# Patient Record
(demographics unavailable — no encounter records)

---

## 2025-04-07 NOTE — DATA REVIEWED
[FreeTextEntry1] : B/L Screening Mammo - 01/16/2025: Breast density: The breast(s) is/are heterogeneously dense, which may obscure small masses.   There is a focal asymmetry in the lower central right breast posterior depth.   There is an asymmetry in the lateral right breast mid depth.   There is an asymmetry in the slightly lateral left breast posterior depth.   No other significant mammographic findings are seen. IMPRESSION:      1. Focal asymmetry in the lower central right breast posterior depth for which additional mammographic imaging and possible ultrasound is recommended.   2. Asymmetry in the lateral right breast for which additional mammographic imaging and possible ultrasound is recommended.   2. Asymmetry in the lateral left breast for which additional mammographic imaging and possible ultrasound is recommended.   An additional imaging exam of both breast(s) is recommended.   The patient will be sent a letter to return for additional imaging.   BI-RADS 0: INCOMPLETE - NEED ADDITIONAL IMAGING EVALUATION   B/L Dx Mammo & Sono - 02/13/2025: MAMMOGRAM:    Tomosynthesis 3D and 2D imaging of the breast(s) were performed.  Current study was also evaluated with a computer aided detection (CAD) system.   Breast Density: There are scattered areas of fibroglandular density.   In the right breast at 6:00 location there is an irregular mass which persists and additional imaging. The previously questioned asymmetry in the outer left breast partially persists on additional imaging   US BREAST COMPLETE BILATERAL   Ultrasound evaluation was performed including examination of all four quadrants of the breast(s) and the retroareolar regions.   Color flow, Gray scale and real-time ultrasound of both breasts was performed.    In the right breast at 6:00 location 8 cm from the nipple there is a hypoechoic irregular mass measuring 0.4 x 0.5 x 0.6 cm. Ultrasound core biopsy is recommended for further evaluation. No additional suspicious abnormalities were seen in the right breast. In the left breast at 3:00 location 9 cm from the nipple corresponding to the mammographic abnormality there is a benign cyst measuring 0.6 cm. No suspicious lymph nodes were seen in either axilla. OVERALL IMPRESSION:    Indeterminant mass in the right breast at 6:00 location 8 cm from the nipple corresponding to the mammographic abnormality for which ultrasound core biopsy is recommended.   No mammographic or sonographic evidence of malignancy in the left breast.   Biopsy of the right breast(s) is recommended.   A letter will be sent to the patient to return for a biopsy.   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 Surgical consultation and oncologic management of the right breast(s) are recommended.

## 2025-04-07 NOTE — DATA REVIEWED
[FreeTextEntry1] : B/L Screening Mammo - 01/16/2025: Breast density: The breast(s) is/are heterogeneously dense, which may obscure small masses.   There is a focal asymmetry in the lower central right breast posterior depth.   There is an asymmetry in the lateral right breast mid depth.   There is an asymmetry in the slightly lateral left breast posterior depth.   No other significant mammographic findings are seen. IMPRESSION:      1. Focal asymmetry in the lower central right breast posterior depth for which additional mammographic imaging and possible ultrasound is recommended.   2. Asymmetry in the lateral right breast for which additional mammographic imaging and possible ultrasound is recommended.   2. Asymmetry in the lateral left breast for which additional mammographic imaging and possible ultrasound is recommended.   An additional imaging exam of both breast(s) is recommended.   The patient will be sent a letter to return for additional imaging.   BI-RADS 0: INCOMPLETE - NEED ADDITIONAL IMAGING EVALUATION   B/L Dx Mammo & Sono - 02/13/2025: MAMMOGRAM:    Tomosynthesis 3D and 2D imaging of the breast(s) were performed.  Current study was also evaluated with a computer aided detection (CAD) system.   Breast Density: There are scattered areas of fibroglandular density.   In the right breast at 6:00 location there is an irregular mass which persists and additional imaging. The previously questioned asymmetry in the outer left breast partially persists on additional imaging   US BREAST COMPLETE BILATERAL   Ultrasound evaluation was performed including examination of all four quadrants of the breast(s) and the retroareolar regions.   Color flow, Gray scale and real-time ultrasound of both breasts was performed.    In the right breast at 6:00 location 8 cm from the nipple there is a hypoechoic irregular mass measuring 0.4 x 0.5 x 0.6 cm. Ultrasound core biopsy is recommended for further evaluation. No additional suspicious abnormalities were seen in the right breast. In the left breast at 3:00 location 9 cm from the nipple corresponding to the mammographic abnormality there is a benign cyst measuring 0.6 cm. No suspicious lymph nodes were seen in either axilla. OVERALL IMPRESSION:    Indeterminant mass in the right breast at 6:00 location 8 cm from the nipple corresponding to the mammographic abnormality for which ultrasound core biopsy is recommended.   No mammographic or sonographic evidence of malignancy in the left breast.   Biopsy of the right breast(s) is recommended.   A letter will be sent to the patient to return for a biopsy.   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 Surgical consultation and oncologic management of the right breast(s) are recommended.

## 2025-04-07 NOTE — ASSESSMENT
[FreeTextEntry1] : CHEKO MTZ is a 70-year-old female patient with right breast cancer (+/+/-)  Her work-up is as follows: B/L Screening Mammo - 01/16/2025: -The breast(s) is/are heterogeneously dense, which may obscure small masses. -There is a focal asymmetry in the lower central right breast posterior depth. -There is an asymmetry in the lateral right breast mid depth. -There is an asymmetry in the slightly lateral left breast posterior depth. BI-RADS 0: INCOMPLETE - NEED ADDITIONAL IMAGING EVALUATION  B/L Dx Mammo & Sono - 02/13/2025: -In the right breast at 6:00 location there is an irregular mass which persists and additional imaging. -The previously questioned asymmetry in the outer left breast partially persists on additional imaging US BREAST COMPLETE BILATERAL -In the right breast at 6:00 location 8 cm from the nipple there is a hypoechoic irregular mass measuring 0.4 x 0.5 x 0.6 cm. Ultrasound core biopsy is recommended for further evaluation. -In the left breast at 3:00 location 9 cm from the nipple corresponding to the mammographic abnormality there is a benign cyst measuring 0.6 cm. -No suspicious lymph nodes were seen in either axilla. 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 I have explained to the patient that the needle biopsy yielded a diagnosis of invasive breast cancer.  Breast MRI was discussed for further evaluation of the extent of disease/possible presence of multicentric and /or contralateral disease.  We reviewed the treatment of breast cancer, including surgery, radiation, chemotherapy, and anti-estrogen treatment.  Surgical options were reviewed, including a wide excision versus a mastectomy with or without reconstruction. She understood that one could still have a recurrence after a mastectomy. Patient understands that her overall survival is equivalent whether she undergoes a partial mastectomy with adjuvant radiotherapy or whether she undergoes a mastectomy, as evidenced by the NSABP B-06 trial. I have explained to her that while survival rates are the same between these two options, breast conservation therapy is associated with a higher risk of local recurrence, approximately 5 to 10% over 10 years (Nuha et al. J Clin Oncol, 2017). This is compared to a local recurrence rate of 1% over 10 years following mastectomy.  Axillary staging was discussed. We reviewed the sentinel lymph node procedure and associated risks of lymphedema, numbness, range of motion deficit and damage to surrounding structures. we discussed the Choosing Wisely guidelines for omitting a SLNB.   The general indications for the use of adjuvant hormonal therapy, chemotherapy and targeted therapies were discussed. It was explained that medical treatments are dependent on the pathology results including the receptor status. We reviewed that her cancer is estrogen positive, and that may necessitate anti-estrogen therapy for 5years. We discussed that a genomic assay, Oncotype Dx, might be sent on her surgical specimen and this will determine her need for adjuvant chemotherapy. She will be referred to medical oncology for an interpretation of her results and further treatment after the surgery.  The indications for radiation therapy and common side effects were discussed. The patient will meet with a radiation oncologist if indicated. Radiation is usually needed after breast conservation. In addition, even with a mastectomy, radiation might be needed under certain circumstances such as close margins and positive nodes. We discussed that radiotherapy is usually given Monday through Friday for 3-5 weeks, but could be longer or shorter.   The genetics of breast cancer were discussed.  Patient wishes to proceed with a lumpectomy, without SLNB, pending MRI. We reviewed that the risks of the operation include, but are not limited to damage to surrounding structures, infection, bleeding, cosmetic deformity, sensory changes, need for re-excision, and anesthetic related complications and an up to 8% risk of upper extremity lymphedema with a sentinel node biopsy as quoted by the NSABP trials. She understands that with lumpectomy, if margins are positive, additional surgery would be required.  All questions and concerns were answered in detail.  MRI. Patient is for right breast needle localized lumpectomy, pending MRI.  Total time spent on encounter was greater than 60 minutes , which included face to face time with the patient, performing an exam, reviewing previous medical records, reviewing current imaging/ pathology, documenting in patient record and coordinating care/imaging. Greater than 50% of the encounter was spent on counseling and coordination of her breast issue.  minor

## 2025-04-07 NOTE — HISTORY OF PRESENT ILLNESS
[FreeTextEntry1] : CHEKO MTZ is a 70-year-old female patient who presents today for initial consultation for recently diagnosed right breast cancer (+/+/-)  Family hx - breast ca - mother.  Her work-up is as follows: B/L Screening Mammo - 01/16/2025: -The breast(s) is/are heterogeneously dense, which may obscure small masses. -There is a focal asymmetry in the lower central right breast posterior depth. -There is an asymmetry in the lateral right breast mid depth. -There is an asymmetry in the slightly lateral left breast posterior depth. BI-RADS 0: INCOMPLETE - NEED ADDITIONAL IMAGING EVALUATION  B/L Dx Mammo & Sono - 02/13/2025: -In the right breast at 6:00 location there is an irregular mass which persists and additional imaging.  -The previously questioned asymmetry in the outer left breast partially persists on additional imaging  US BREAST COMPLETE BILATERAL -In the right breast at 6:00 location 8 cm from the nipple there is a hypoechoic irregular mass measuring 0.4 x 0.5 x 0.6 cm.  Ultrasound core biopsy is recommended for further evaluation. -In the left breast at 3:00 location 9 cm from the nipple corresponding to the mammographic abnormality there is a benign cyst measuring 0.6 cm. -No suspicious lymph nodes were seen in either axilla. 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 Surgical consultation and oncologic management of the right breast(s) are recommended.  Denies any current complaints. No acute changes to her breasts.

## 2025-04-07 NOTE — DATA REVIEWED
[FreeTextEntry1] : B/L Screening Mammo - 01/16/2025: Breast density: The breast(s) is/are heterogeneously dense, which may obscure small masses.   There is a focal asymmetry in the lower central right breast posterior depth.   There is an asymmetry in the lateral right breast mid depth.   There is an asymmetry in the slightly lateral left breast posterior depth.   No other significant mammographic findings are seen. IMPRESSION:      1. Focal asymmetry in the lower central right breast posterior depth for which additional mammographic imaging and possible ultrasound is recommended.   2. Asymmetry in the lateral right breast for which additional mammographic imaging and possible ultrasound is recommended.   2. Asymmetry in the lateral left breast for which additional mammographic imaging and possible ultrasound is recommended.   An additional imaging exam of both breast(s) is recommended.   The patient will be sent a letter to return for additional imaging.   BI-RADS 0: INCOMPLETE - NEED ADDITIONAL IMAGING EVALUATION   B/L Dx Mammo & Sono - 02/13/2025: MAMMOGRAM:    Tomosynthesis 3D and 2D imaging of the breast(s) were performed.  Current study was also evaluated with a computer aided detection (CAD) system.   Breast Density: There are scattered areas of fibroglandular density.   In the right breast at 6:00 location there is an irregular mass which persists and additional imaging. The previously questioned asymmetry in the outer left breast partially persists on additional imaging   US BREAST COMPLETE BILATERAL   Ultrasound evaluation was performed including examination of all four quadrants of the breast(s) and the retroareolar regions.   Color flow, Gray scale and real-time ultrasound of both breasts was performed.    In the right breast at 6:00 location 8 cm from the nipple there is a hypoechoic irregular mass measuring 0.4 x 0.5 x 0.6 cm. Ultrasound core biopsy is recommended for further evaluation. No additional suspicious abnormalities were seen in the right breast. In the left breast at 3:00 location 9 cm from the nipple corresponding to the mammographic abnormality there is a benign cyst measuring 0.6 cm. No suspicious lymph nodes were seen in either axilla. OVERALL IMPRESSION:    Indeterminant mass in the right breast at 6:00 location 8 cm from the nipple corresponding to the mammographic abnormality for which ultrasound core biopsy is recommended.   No mammographic or sonographic evidence of malignancy in the left breast.   Biopsy of the right breast(s) is recommended.   A letter will be sent to the patient to return for a biopsy.   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 Surgical consultation and oncologic management of the right breast(s) are recommended.

## 2025-04-07 NOTE — ASSESSMENT
[FreeTextEntry1] : CHEKO MTZ is a 70-year-old female patient with right breast cancer (+/+/-)  Her work-up is as follows: B/L Screening Mammo - 01/16/2025: -The breast(s) is/are heterogeneously dense, which may obscure small masses. -There is a focal asymmetry in the lower central right breast posterior depth. -There is an asymmetry in the lateral right breast mid depth. -There is an asymmetry in the slightly lateral left breast posterior depth. BI-RADS 0: INCOMPLETE - NEED ADDITIONAL IMAGING EVALUATION  B/L Dx Mammo & Sono - 02/13/2025: -In the right breast at 6:00 location there is an irregular mass which persists and additional imaging. -The previously questioned asymmetry in the outer left breast partially persists on additional imaging US BREAST COMPLETE BILATERAL -In the right breast at 6:00 location 8 cm from the nipple there is a hypoechoic irregular mass measuring 0.4 x 0.5 x 0.6 cm. Ultrasound core biopsy is recommended for further evaluation. -In the left breast at 3:00 location 9 cm from the nipple corresponding to the mammographic abnormality there is a benign cyst measuring 0.6 cm. -No suspicious lymph nodes were seen in either axilla. 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 I have explained to the patient that the needle biopsy yielded a diagnosis of invasive breast cancer.  Breast MRI was discussed for further evaluation of the extent of disease/possible presence of multicentric and /or contralateral disease.  We reviewed the treatment of breast cancer, including surgery, radiation, chemotherapy, and anti-estrogen treatment.  Surgical options were reviewed, including a wide excision versus a mastectomy with or without reconstruction. She understood that one could still have a recurrence after a mastectomy. Patient understands that her overall survival is equivalent whether she undergoes a partial mastectomy with adjuvant radiotherapy or whether she undergoes a mastectomy, as evidenced by the NSABP B-06 trial. I have explained to her that while survival rates are the same between these two options, breast conservation therapy is associated with a higher risk of local recurrence, approximately 5 to 10% over 10 years (Nuha et al. J Clin Oncol, 2017). This is compared to a local recurrence rate of 1% over 10 years following mastectomy.  Axillary staging was discussed. We reviewed the sentinel lymph node procedure and associated risks of lymphedema, numbness, range of motion deficit and damage to surrounding structures. we discussed the Choosing Wisely guidelines for omitting a SLNB.   The general indications for the use of adjuvant hormonal therapy, chemotherapy and targeted therapies were discussed. It was explained that medical treatments are dependent on the pathology results including the receptor status. We reviewed that her cancer is estrogen positive, and that may necessitate anti-estrogen therapy for 5years. We discussed that a genomic assay, Oncotype Dx, might be sent on her surgical specimen and this will determine her need for adjuvant chemotherapy. She will be referred to medical oncology for an interpretation of her results and further treatment after the surgery.  The indications for radiation therapy and common side effects were discussed. The patient will meet with a radiation oncologist if indicated. Radiation is usually needed after breast conservation. In addition, even with a mastectomy, radiation might be needed under certain circumstances such as close margins and positive nodes. We discussed that radiotherapy is usually given Monday through Friday for 3-5 weeks, but could be longer or shorter.   The genetics of breast cancer were discussed.  Patient wishes to proceed with a lumpectomy, without SLNB, pending MRI. We reviewed that the risks of the operation include, but are not limited to damage to surrounding structures, infection, bleeding, cosmetic deformity, sensory changes, need for re-excision, and anesthetic related complications and an up to 8% risk of upper extremity lymphedema with a sentinel node biopsy as quoted by the NSABP trials. She understands that with lumpectomy, if margins are positive, additional surgery would be required.  All questions and concerns were answered in detail.  MRI. Patient is for right breast needle localized lumpectomy, pending MRI.  Total time spent on encounter was greater than 60 minutes , which included face to face time with the patient, performing an exam, reviewing previous medical records, reviewing current imaging/ pathology, documenting in patient record and coordinating care/imaging. Greater than 50% of the encounter was spent on counseling and coordination of her breast issue.

## 2025-04-07 NOTE — DATA REVIEWED
[FreeTextEntry1] : B/L Screening Mammo - 01/16/2025: Breast density: The breast(s) is/are heterogeneously dense, which may obscure small masses.   There is a focal asymmetry in the lower central right breast posterior depth.   There is an asymmetry in the lateral right breast mid depth.   There is an asymmetry in the slightly lateral left breast posterior depth.   No other significant mammographic findings are seen. IMPRESSION:      1. Focal asymmetry in the lower central right breast posterior depth for which additional mammographic imaging and possible ultrasound is recommended.   2. Asymmetry in the lateral right breast for which additional mammographic imaging and possible ultrasound is recommended.   2. Asymmetry in the lateral left breast for which additional mammographic imaging and possible ultrasound is recommended.   An additional imaging exam of both breast(s) is recommended.   The patient will be sent a letter to return for additional imaging.   BI-RADS 0: INCOMPLETE - NEED ADDITIONAL IMAGING EVALUATION   B/L Dx Mammo & Sono - 02/13/2025: MAMMOGRAM:    Tomosynthesis 3D and 2D imaging of the breast(s) were performed.  Current study was also evaluated with a computer aided detection (CAD) system.   Breast Density: There are scattered areas of fibroglandular density.   In the right breast at 6:00 location there is an irregular mass which persists and additional imaging. The previously questioned asymmetry in the outer left breast partially persists on additional imaging   US BREAST COMPLETE BILATERAL   Ultrasound evaluation was performed including examination of all four quadrants of the breast(s) and the retroareolar regions.   Color flow, Gray scale and real-time ultrasound of both breasts was performed.    In the right breast at 6:00 location 8 cm from the nipple there is a hypoechoic irregular mass measuring 0.4 x 0.5 x 0.6 cm. Ultrasound core biopsy is recommended for further evaluation. No additional suspicious abnormalities were seen in the right breast. In the left breast at 3:00 location 9 cm from the nipple corresponding to the mammographic abnormality there is a benign cyst measuring 0.6 cm. No suspicious lymph nodes were seen in either axilla. OVERALL IMPRESSION:    Indeterminant mass in the right breast at 6:00 location 8 cm from the nipple corresponding to the mammographic abnormality for which ultrasound core biopsy is recommended.   No mammographic or sonographic evidence of malignancy in the left breast.   Biopsy of the right breast(s) is recommended.   A letter will be sent to the patient to return for a biopsy.   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 Surgical consultation and oncologic management of the right breast(s) are recommended.

## 2025-04-07 NOTE — HISTORY OF PRESENT ILLNESS
[FreeTextEntry1] : CHEKO MTZ is a 70-year-old female patient who presents today for initial consultation for recently diagnosed right breast cancer (+/+/-)  Family hx - breast ca - mother.  Her work-up is as follows: B/L Screening Mammo - 01/16/2025: -The breast(s) is/are heterogeneously dense, which may obscure small masses. -There is a focal asymmetry in the lower central right breast posterior depth. -There is an asymmetry in the lateral right breast mid depth. -There is an asymmetry in the slightly lateral left breast posterior depth. BI-RADS 0: INCOMPLETE - NEED ADDITIONAL IMAGING EVALUATION  B/L Dx Mammo & Sono - 02/13/2025: -In the right breast at 6:00 location there is an irregular mass which persists and additional imaging.  -The previously questioned asymmetry in the outer left breast partially persists on additional imaging  US BREAST COMPLETE BILATERAL -In the right breast at 6:00 location 8 cm from the nipple there is a hypoechoic irregular mass measuring 0.4 x 0.5 x 0.6 cm.  Ultrasound core biopsy is recommended for further evaluation. -In the left breast at 3:00 location 9 cm from the nipple corresponding to the mammographic abnormality there is a benign cyst measuring 0.6 cm. -No suspicious lymph nodes were seen in either axilla. 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 Surgical consultation and oncologic management of the right breast(s) are recommended.  Denies any current complaints. No acute changes to her breasts.

## 2025-04-07 NOTE — PHYSICAL EXAM
[Normocephalic] : normocephalic [EOMI] : extra ocular movement intact [No Supraclavicular Adenopathy] : no supraclavicular adenopathy [No Cervical Adenopathy] : no cervical adenopathy [Examined in the supine and seated position] : examined in the supine and seated position [No dominant masses] : no dominant masses in right breast  [No dominant masses] : no dominant masses left breast [No Nipple Retraction] : no left nipple retraction [No Nipple Discharge] : no left nipple discharge [No Axillary Lymphadenopathy] : no left axillary lymphadenopathy [No Rashes] : no rashes [No Ulceration] : no ulceration [de-identified] : NL respirations

## 2025-04-07 NOTE — ASSESSMENT
[FreeTextEntry1] : CHEKO MTZ is a 70-year-old female patient with right breast cancer (+/+/-)  Her work-up is as follows: B/L Screening Mammo - 01/16/2025: -The breast(s) is/are heterogeneously dense, which may obscure small masses. -There is a focal asymmetry in the lower central right breast posterior depth. -There is an asymmetry in the lateral right breast mid depth. -There is an asymmetry in the slightly lateral left breast posterior depth. BI-RADS 0: INCOMPLETE - NEED ADDITIONAL IMAGING EVALUATION  B/L Dx Mammo & Sono - 02/13/2025: -In the right breast at 6:00 location there is an irregular mass which persists and additional imaging. -The previously questioned asymmetry in the outer left breast partially persists on additional imaging US BREAST COMPLETE BILATERAL -In the right breast at 6:00 location 8 cm from the nipple there is a hypoechoic irregular mass measuring 0.4 x 0.5 x 0.6 cm. Ultrasound core biopsy is recommended for further evaluation. -In the left breast at 3:00 location 9 cm from the nipple corresponding to the mammographic abnormality there is a benign cyst measuring 0.6 cm. -No suspicious lymph nodes were seen in either axilla. 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 I have explained to the patient that the needle biopsy yielded a diagnosis of invasive breast cancer.  Breast MRI was discussed for further evaluation of the extent of disease/possible presence of multicentric and /or contralateral disease.  We reviewed the treatment of breast cancer, including surgery, radiation, chemotherapy, and anti-estrogen treatment.  Surgical options were reviewed, including a wide excision versus a mastectomy with or without reconstruction. She understood that one could still have a recurrence after a mastectomy. Patient understands that her overall survival is equivalent whether she undergoes a partial mastectomy with adjuvant radiotherapy or whether she undergoes a mastectomy, as evidenced by the NSABP B-06 trial. I have explained to her that while survival rates are the same between these two options, breast conservation therapy is associated with a higher risk of local recurrence, approximately 5 to 10% over 10 years (Nuha et al. J Clin Oncol, 2017). This is compared to a local recurrence rate of 1% over 10 years following mastectomy.  Axillary staging was discussed. We reviewed the sentinel lymph node procedure and associated risks of lymphedema, numbness, range of motion deficit and damage to surrounding structures. we discussed the Choosing Wisely guidelines for omitting a SLNB.   The general indications for the use of adjuvant hormonal therapy, chemotherapy and targeted therapies were discussed. It was explained that medical treatments are dependent on the pathology results including the receptor status. We reviewed that her cancer is estrogen positive, and that may necessitate anti-estrogen therapy for 5years. We discussed that a genomic assay, Oncotype Dx, might be sent on her surgical specimen and this will determine her need for adjuvant chemotherapy. She will be referred to medical oncology for an interpretation of her results and further treatment after the surgery.  The indications for radiation therapy and common side effects were discussed. The patient will meet with a radiation oncologist if indicated. Radiation is usually needed after breast conservation. In addition, even with a mastectomy, radiation might be needed under certain circumstances such as close margins and positive nodes. We discussed that radiotherapy is usually given Monday through Friday for 3-5 weeks, but could be longer or shorter.   The genetics of breast cancer were discussed.  Patient wishes to proceed with a lumpectomy, without SLNB, pending MRI. We reviewed that the risks of the operation include, but are not limited to damage to surrounding structures, infection, bleeding, cosmetic deformity, sensory changes, need for re-excision, and anesthetic related complications and an up to 8% risk of upper extremity lymphedema with a sentinel node biopsy as quoted by the NSABP trials. She understands that with lumpectomy, if margins are positive, additional surgery would be required.  All questions and concerns were answered in detail.  MRI. Patient is for right breast needle localized lumpectomy, pending MRI.  Total time spent on encounter was greater than 60 minutes , which included face to face time with the patient, performing an exam, reviewing previous medical records, reviewing current imaging/ pathology, documenting in patient record and coordinating care/imaging. Greater than 50% of the encounter was spent on counseling and coordination of her breast issue.

## 2025-04-07 NOTE — PHYSICAL EXAM
[Normocephalic] : normocephalic [EOMI] : extra ocular movement intact [No Supraclavicular Adenopathy] : no supraclavicular adenopathy [No Cervical Adenopathy] : no cervical adenopathy [Examined in the supine and seated position] : examined in the supine and seated position [No dominant masses] : no dominant masses in right breast  [No dominant masses] : no dominant masses left breast [No Nipple Retraction] : no left nipple retraction [No Nipple Discharge] : no left nipple discharge [No Axillary Lymphadenopathy] : no left axillary lymphadenopathy [No Rashes] : no rashes [No Ulceration] : no ulceration [de-identified] : NL respirations

## 2025-04-07 NOTE — PHYSICAL EXAM
[Normocephalic] : normocephalic [EOMI] : extra ocular movement intact [No Supraclavicular Adenopathy] : no supraclavicular adenopathy [No Cervical Adenopathy] : no cervical adenopathy [Examined in the supine and seated position] : examined in the supine and seated position [No dominant masses] : no dominant masses in right breast  [No dominant masses] : no dominant masses left breast [No Nipple Retraction] : no left nipple retraction [No Nipple Discharge] : no left nipple discharge [No Axillary Lymphadenopathy] : no left axillary lymphadenopathy [No Rashes] : no rashes [No Ulceration] : no ulceration [de-identified] : NL respirations

## 2025-04-07 NOTE — HISTORY OF PRESENT ILLNESS
[FreeTextEntry1] : CHEKO MTZ is a 70-year-old female patient who presents today for initial consultation for recently diagnosed right breast cancer (+/+/-)  Family hx - breast ca - mother.  Her work-up is as follows: B/L Screening Mammo - 01/16/2025: -The breast(s) is/are heterogeneously dense, which may obscure small masses. -There is a focal asymmetry in the lower central right breast posterior depth. -There is an asymmetry in the lateral right breast mid depth. -There is an asymmetry in the slightly lateral left breast posterior depth. BI-RADS 0: INCOMPLETE - NEED ADDITIONAL IMAGING EVALUATION  B/L Dx Mammo & Sono - 02/13/2025: -In the right breast at 6:00 location there is an irregular mass which persists and additional imaging.  -The previously questioned asymmetry in the outer left breast partially persists on additional imaging  US BREAST COMPLETE BILATERAL -In the right breast at 6:00 location 8 cm from the nipple there is a hypoechoic irregular mass measuring 0.4 x 0.5 x 0.6 cm.  Ultrasound core biopsy is recommended for further evaluation. -In the left breast at 3:00 location 9 cm from the nipple corresponding to the mammographic abnormality there is a benign cyst measuring 0.6 cm. -No suspicious lymph nodes were seen in either axilla. 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 Surgical consultation and oncologic management of the right breast(s) are recommended.  Denies any current complaints. No acute changes to her breasts.

## 2025-04-07 NOTE — PHYSICAL EXAM
[Normocephalic] : normocephalic [EOMI] : extra ocular movement intact [No Supraclavicular Adenopathy] : no supraclavicular adenopathy [No Cervical Adenopathy] : no cervical adenopathy [Examined in the supine and seated position] : examined in the supine and seated position [No dominant masses] : no dominant masses in right breast  [No dominant masses] : no dominant masses left breast [No Nipple Retraction] : no left nipple retraction [No Nipple Discharge] : no left nipple discharge [No Axillary Lymphadenopathy] : no left axillary lymphadenopathy [No Rashes] : no rashes [No Ulceration] : no ulceration [de-identified] : NL respirations

## 2025-04-07 NOTE — HISTORY OF PRESENT ILLNESS
[FreeTextEntry1] : CHEKO MTZ is a 70-year-old female patient who presents today for initial consultation for recently diagnosed right breast cancer (+/+/-)  Family hx - breast ca - mother.  Her work-up is as follows: B/L Screening Mammo - 01/16/2025: -The breast(s) is/are heterogeneously dense, which may obscure small masses. -There is a focal asymmetry in the lower central right breast posterior depth. -There is an asymmetry in the lateral right breast mid depth. -There is an asymmetry in the slightly lateral left breast posterior depth. BI-RADS 0: INCOMPLETE - NEED ADDITIONAL IMAGING EVALUATION  B/L Dx Mammo & Sono - 02/13/2025: -In the right breast at 6:00 location there is an irregular mass which persists and additional imaging.  -The previously questioned asymmetry in the outer left breast partially persists on additional imaging  US BREAST COMPLETE BILATERAL -In the right breast at 6:00 location 8 cm from the nipple there is a hypoechoic irregular mass measuring 0.4 x 0.5 x 0.6 cm.  Ultrasound core biopsy is recommended for further evaluation. -In the left breast at 3:00 location 9 cm from the nipple corresponding to the mammographic abnormality there is a benign cyst measuring 0.6 cm. -No suspicious lymph nodes were seen in either axilla. 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 Surgical consultation and oncologic management of the right breast(s) are recommended.  Denies any current complaints. No acute changes to her breasts.

## 2025-04-07 NOTE — ASSESSMENT
[FreeTextEntry1] : CHEKO MTZ is a 70-year-old female patient with right breast cancer (+/+/-)  Her work-up is as follows: B/L Screening Mammo - 01/16/2025: -The breast(s) is/are heterogeneously dense, which may obscure small masses. -There is a focal asymmetry in the lower central right breast posterior depth. -There is an asymmetry in the lateral right breast mid depth. -There is an asymmetry in the slightly lateral left breast posterior depth. BI-RADS 0: INCOMPLETE - NEED ADDITIONAL IMAGING EVALUATION  B/L Dx Mammo & Sono - 02/13/2025: -In the right breast at 6:00 location there is an irregular mass which persists and additional imaging. -The previously questioned asymmetry in the outer left breast partially persists on additional imaging US BREAST COMPLETE BILATERAL -In the right breast at 6:00 location 8 cm from the nipple there is a hypoechoic irregular mass measuring 0.4 x 0.5 x 0.6 cm. Ultrasound core biopsy is recommended for further evaluation. -In the left breast at 3:00 location 9 cm from the nipple corresponding to the mammographic abnormality there is a benign cyst measuring 0.6 cm. -No suspicious lymph nodes were seen in either axilla. 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 I have explained to the patient that the needle biopsy yielded a diagnosis of invasive breast cancer.  Breast MRI was discussed for further evaluation of the extent of disease/possible presence of multicentric and /or contralateral disease.  We reviewed the treatment of breast cancer, including surgery, radiation, chemotherapy, and anti-estrogen treatment.  Surgical options were reviewed, including a wide excision versus a mastectomy with or without reconstruction. She understood that one could still have a recurrence after a mastectomy. Patient understands that her overall survival is equivalent whether she undergoes a partial mastectomy with adjuvant radiotherapy or whether she undergoes a mastectomy, as evidenced by the NSABP B-06 trial. I have explained to her that while survival rates are the same between these two options, breast conservation therapy is associated with a higher risk of local recurrence, approximately 5 to 10% over 10 years (Nuha et al. J Clin Oncol, 2017). This is compared to a local recurrence rate of 1% over 10 years following mastectomy.  Axillary staging was discussed. We reviewed the sentinel lymph node procedure and associated risks of lymphedema, numbness, range of motion deficit and damage to surrounding structures. we discussed the Choosing Wisely guidelines for omitting a SLNB.   The general indications for the use of adjuvant hormonal therapy, chemotherapy and targeted therapies were discussed. It was explained that medical treatments are dependent on the pathology results including the receptor status. We reviewed that her cancer is estrogen positive, and that may necessitate anti-estrogen therapy for 5years. We discussed that a genomic assay, Oncotype Dx, might be sent on her surgical specimen and this will determine her need for adjuvant chemotherapy. She will be referred to medical oncology for an interpretation of her results and further treatment after the surgery.  The indications for radiation therapy and common side effects were discussed. The patient will meet with a radiation oncologist if indicated. Radiation is usually needed after breast conservation. In addition, even with a mastectomy, radiation might be needed under certain circumstances such as close margins and positive nodes. We discussed that radiotherapy is usually given Monday through Friday for 3-5 weeks, but could be longer or shorter.   The genetics of breast cancer were discussed.  Patient wishes to proceed with a lumpectomy, without SLNB, pending MRI. We reviewed that the risks of the operation include, but are not limited to damage to surrounding structures, infection, bleeding, cosmetic deformity, sensory changes, need for re-excision, and anesthetic related complications and an up to 8% risk of upper extremity lymphedema with a sentinel node biopsy as quoted by the NSABP trials. She understands that with lumpectomy, if margins are positive, additional surgery would be required.  All questions and concerns were answered in detail.  MRI. Patient is for right breast needle localized lumpectomy, pending MRI.  Total time spent on encounter was greater than 60 minutes , which included face to face time with the patient, performing an exam, reviewing previous medical records, reviewing current imaging/ pathology, documenting in patient record and coordinating care/imaging. Greater than 50% of the encounter was spent on counseling and coordination of her breast issue.

## 2025-05-20 NOTE — ASSESSMENT
[FreeTextEntry1] : CHEKO MTZ is a 70-year-old female patient who presents for right breast cancer (+/+/-), right ALH and left radial scar s/p bilateral lumpectomies 5/7/25: right= invasive moderately differentiated ductal carcinoma, 3.0 mm; Right ALH. pT1b, pNx, pMx; Left = radial scar  Family hx - breast ca - mother.  Her work-up is as follows: B/L Screening Mammo - 01/16/2025: -The breast(s) is/are heterogeneously dense, which may obscure small masses. -There is a focal asymmetry in the lower central right breast posterior depth. -There is an asymmetry in the lateral right breast mid depth. -There is an asymmetry in the slightly lateral left breast posterior depth. BI-RADS 0: INCOMPLETE - NEED ADDITIONAL IMAGING EVALUATION  B/L Dx Mammo & Sono - 02/13/2025: -In the right breast at 6:00 location there is an irregular mass which persists and additional imaging. -The previously questioned asymmetry in the outer left breast partially persists on additional imaging US BREAST COMPLETE BILATERAL -In the right breast at 6:00 location 8 cm from the nipple there is a hypoechoic irregular mass measuring 0.4 x 0.5 x 0.6 cm. Ultrasound core biopsy is recommended for further evaluation. -In the left breast at 3:00 location 9 cm from the nipple corresponding to the mammographic abnormality there is a benign cyst measuring 0.6 cm. -No suspicious lymph nodes were seen in either axilla. 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 Surgical consultation and oncologic management of the right breast(s) are recommended.  4/7/25: MRI --> BIRADS 4 RIGHT BREAST: 0.9 x 0.7 x 0.9 cm index carcinoma with adjacent nonmass enhancement and associated biopsy clip (11409-71, 6-84). 0.5 cm enhancing focus with suspicious enhancement kinetics, superior central breast (31433-28, 6-8 5). MRI guided biopsy is recommended. Multiple additional areas of similar non-mass enhancement in both breasts, with a dominant area of non-mass enhancement on the left (see below).  LEFT BREAST: Multiple similar appearing areas of non-mass enhancement in both breasts; representative biopsy of the most dominant area of non-mass enhancement in the lateral left breast is recommended ([id number], 6-20).  4/15/25: MRI Bx, B 1. BREAST, RIGHT ENHANCEMENT, MRI GUIDED NEEDLE CORE BIOPSIES: - LOBULAR CARCINOMA IN SITU (LCIS), CLASSICAL TYPE. - ATROPHIC BREAST TISSUE WITH PROLIFERATIVE TYPE FIBROCYSTIC CHANGES ASSOCIATED WITH PHOSPHATE MICROCALCIFICATIONS PRESENT IN SMALL DUCTULES.  2. BREAST, LEFT ENHANCEMENT, MRI GUIDED NEEDLE CORE BIOPSIES: - RADIAL SCLEROSING LESION (RADIAL SCAR). - ATROPHIC BREAST TISSUE WITH PROLIFERATIVE TYPE FIBROCYSTIC CHANGES INCLUDING FLORID DUCT HYPERPLASIA, STROMAL FIBROSIS WITH FOCI OF PSEUDOANGIOMATOUS STROMAL HYPERPLASIA (PASH), SCLEROSING ADENOSIS, APOCRINE METAPLASIA, AND PHOSPHATE MICROCALCIFICATIONS PRESENT IN SMALL DUCTULES.  5/7/2025 1. Breast, right 6:00 N8 mass, needle localized lumpectomy: - Prior biopsy site changes with invasive moderately differentiated ductal carcinoma, 3.0 mm (microscopic measurement). - Neither an intraductal component nor lymphovascular invasion is identified. - For final surgical margin status please see specimen parts #2-#7 below. - Classical type lobular carcinoma in situ (LCIS) and atypical lobular hyperplasia (ALH). - Radial sclerosing lesion (radial scar), small duct papilloma, cystic/papillary apocrine metaplasia, microscopic perilobular hemangioma, pseudoangiomatous stromal hyperplasia (PASH), and proliferative type fibrocystic changes associated with phosphate microcalcifications present in small ductules. - Combining the above findings with those from the prior needle core biopsy specimen results in the following AJCC 8th Edition Pathologic Stage: pT1b, pNx, pMx.  2. Breast, right superior margin, excision: - Benign fibrofatty breast tissue without histopathologic abnormality. Negative for carcinoma.  3. Breast, right medial margin, excision: - Benign fibrofatty breast tissue without histopathologic abnormality. Negative for carcinoma.  4. Breast, right inferior margin, excision: - Benign fibrofatty breast tissue without histopathologic abnormality.  Negative for carcinoma.  5. Breast, right lateral margin, excision: - Benign fibrofatty breast tissue without histopathologic abnormality. Negative for carcinoma.  6. Breast, right posterior margin, excision: - Benign fibrofatty breast tissue without histopathologic abnormality. Negative for carcinoma.  7. Breast, right anterior margin, excision: - Benign fibrofatty breast tissue without histopathologic abnormality. Negative for carcinoma.  8. Breast, right superior lesion, needle localized lumpectomy: - Atypical lobular hyperplasia (ALH). - Small hyalinized fibroadenoma, nodular cystic/papillary apocrine metaplasia, and proliferative type fibrocystic changes. - Prior biopsy site changes.  9. Breast, left mass, needle localized lumpectomy: - Large complex sclerosing lesion (radial scar) located adjacent to prior biopsy site changes. - Hyalinized fibroadenoma, nodular cystic/papillary apocrine metaplasia, focal gynecomastia-like hyperplasia associated with pseudoangiomatous stromal hyperplasia (PASH), and proliferative type fibrocystic changes associated with phosphate microcalcifications present in small ductules.   We discussed her pathology in detail. We discussed her margins were negative.  We discussed medical and radiation oncology consults.  We discussed breast density. Increasing breast density has been found to increase ones risk of breast cancer, but at this time, there is no clear indication for additional imaging in this setting, as both US and MRI have not been found to improve survival. One can consider bilateral screening US. However, out of 1000 women screened, the use of routine US will only identify an additional 3-5 cancers. The use of US was found to increase the likelihood of undergoing more imaging and more biopsies. She does have dense breasts.   All questions and concerns were answered in detail.  Referral to medical oncology. Referral to radiation oncology, Patient is for bilateral mmg/us in 111/2025, She is for follow up after imaging, pending any interval changes.  Total time spent on encounter was greater than 20 minutes, which included face to face time with the patient, performing an exam, reviewing previous medical records, reviewing current imaging/ pathology, documenting in patient record and coordinating care/imaging. Greater than 50% of the encounter was spent on counseling and coordination of her breast issue.

## 2025-05-20 NOTE — DATA REVIEWED
[FreeTextEntry1] : 5/7/2025 15:44 EDT  Surgical Pathology Report - Auth (Verified)  Specimen(s) Submitted 1  Right breast mass Time obtained: 2:30 pm Cold ischemic time <1 hour 2  Right breast superior margin Time obtained: 2:32 pm Cold ischemic time <1 hour 3  Right breast medial margin Time obtained: 2:33 pm Cold ischemic time <1 hour 4  Right breast inferior margin Time obtained: 2:34 pm Cold ischemic time <1 hour 5  Right breast lateral margin Time obtained: 2:35 pm Cold ischemic time <1 hour 6  Right breast posterior margin Time obtained: 2:36 pm Cold ischemic time <1 hour 7  Right breast anterior margin Time obtained: 2:37 pm Cold ischemic time <1 hour 8  Right breast superior lesion  Time obtained: 3:01 pm Cold ischemic time <1 hour 9  Left breast mass Time obtained: 3:16 pm Cold ischemic time <1 hour  Final Diagnosis 1.  Breast, right 6:00 N8 mass, needle localized lumpectomy: - Prior biopsy site changes with invasive moderately differentiated ductal carcinoma, 3.0 mm (microscopic measurement). - Neither an intraductal component nor lymphovascular invasion is identified. - For final surgical margin status please see specimen parts #2-#7 below. - Classical type lobular carcinoma in situ (LCIS) and atypical lobular hyperplasia (ALH). - Radial sclerosing lesion (radial scar), small duct papilloma, cystic/papillary apocrine metaplasia, microscopic perilobular hemangioma, pseudoangiomatous stromal hyperplasia (PASH), and proliferative type fibrocystic changes associated with phosphate microcalcifications present in small ductules. - For hormone receptor and oncoprotein expression status please see the pathology report from the prior needle core biopsy specimen ([id number]). - Combining the above findings with those from the prior needle core biopsy specimen results in the following AJCC 8th Edition Pathologic Stage: pT1b, pNx, pMx.  Comment: Review of the prior needle core biopsy ([id number]) reveals the invasive tumor measuring 7.0 mm (microscopic measurement) in the biopsy material.  2.  Breast, right superior margin, excision: - Benign fibrofatty breast tissue without histopathologic abnormality. Negative for carcinoma.  3.  Breast, right medial margin, excision: - Benign fibrofatty breast tissue without histopathologic abnormality. Negative for carcinoma.  4.  Breast, right inferior margin, excision: - Benign fibrofatty breast tissue without histopathologic abnormality.  Negative for carcinoma.  5.  Breast, right lateral margin, excision: - Benign fibrofatty breast tissue without histopathologic abnormality. Negative for carcinoma.  6.  Breast, right posterior margin, excision: - Benign fibrofatty breast tissue without histopathologic abnormality. Negative for carcinoma.  7.  Breast, right anterior margin, excision: - Benign fibrofatty breast tissue without histopathologic abnormality. Negative for carcinoma.  8.  Breast, right superior lesion, needle localized lumpectomy: - Atypical lobular hyperplasia (ALH). - Small hyalinized fibroadenoma, nodular cystic/papillary apocrine metaplasia, and proliferative type fibrocystic changes. - Prior biopsy site changes.  9.  Breast, left mass, needle localized lumpectomy: - Large complex sclerosing lesion (radial scar) located adjacent to prior biopsy site changes. - Hyalinized fibroadenoma, nodular cystic/papillary apocrine metaplasia, focal gynecomastia-like hyperplasia associated with pseudoangiomatous stromal hyperplasia (PASH), and proliferative type fibrocystic changes associated with phosphate microcalcifications present in small ductules.

## 2025-05-20 NOTE — ASSESSMENT
[FreeTextEntry1] : CHEKO MTZ is a 70-year-old female patient who presents for right breast cancer (+/+/-), right ALH and left radial scar s/p bilateral lumpectomies 5/7/25: right= invasive moderately differentiated ductal carcinoma, 3.0 mm; Right ALH. pT1b, pNx, pMx; Left = radial scar  Family hx - breast ca - mother.  Her work-up is as follows: B/L Screening Mammo - 01/16/2025: -The breast(s) is/are heterogeneously dense, which may obscure small masses. -There is a focal asymmetry in the lower central right breast posterior depth. -There is an asymmetry in the lateral right breast mid depth. -There is an asymmetry in the slightly lateral left breast posterior depth. BI-RADS 0: INCOMPLETE - NEED ADDITIONAL IMAGING EVALUATION  B/L Dx Mammo & Sono - 02/13/2025: -In the right breast at 6:00 location there is an irregular mass which persists and additional imaging. -The previously questioned asymmetry in the outer left breast partially persists on additional imaging US BREAST COMPLETE BILATERAL -In the right breast at 6:00 location 8 cm from the nipple there is a hypoechoic irregular mass measuring 0.4 x 0.5 x 0.6 cm. Ultrasound core biopsy is recommended for further evaluation. -In the left breast at 3:00 location 9 cm from the nipple corresponding to the mammographic abnormality there is a benign cyst measuring 0.6 cm. -No suspicious lymph nodes were seen in either axilla. 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 Surgical consultation and oncologic management of the right breast(s) are recommended.  4/7/25: MRI --> BIRADS 4 RIGHT BREAST: 0.9 x 0.7 x 0.9 cm index carcinoma with adjacent nonmass enhancement and associated biopsy clip ([id number], 6-84). 0.5 cm enhancing focus with suspicious enhancement kinetics, superior central breast ([id number], 6-8 5). MRI guided biopsy is recommended. Multiple additional areas of similar non-mass enhancement in both breasts, with a dominant area of non-mass enhancement on the left (see below).  LEFT BREAST: Multiple similar appearing areas of non-mass enhancement in both breasts; representative biopsy of the most dominant area of non-mass enhancement in the lateral left breast is recommended ([id number], 6-20).  4/15/25: MRI Bx, B 1. BREAST, RIGHT ENHANCEMENT, MRI GUIDED NEEDLE CORE BIOPSIES: - LOBULAR CARCINOMA IN SITU (LCIS), CLASSICAL TYPE. - ATROPHIC BREAST TISSUE WITH PROLIFERATIVE TYPE FIBROCYSTIC CHANGES ASSOCIATED WITH PHOSPHATE MICROCALCIFICATIONS PRESENT IN SMALL DUCTULES.  2. BREAST, LEFT ENHANCEMENT, MRI GUIDED NEEDLE CORE BIOPSIES: - RADIAL SCLEROSING LESION (RADIAL SCAR). - ATROPHIC BREAST TISSUE WITH PROLIFERATIVE TYPE FIBROCYSTIC CHANGES INCLUDING FLORID DUCT HYPERPLASIA, STROMAL FIBROSIS WITH FOCI OF PSEUDOANGIOMATOUS STROMAL HYPERPLASIA (PASH), SCLEROSING ADENOSIS, APOCRINE METAPLASIA, AND PHOSPHATE MICROCALCIFICATIONS PRESENT IN SMALL DUCTULES.  5/7/2025 1. Breast, right 6:00 N8 mass, needle localized lumpectomy: - Prior biopsy site changes with invasive moderately differentiated ductal carcinoma, 3.0 mm (microscopic measurement). - Neither an intraductal component nor lymphovascular invasion is identified. - For final surgical margin status please see specimen parts #2-#7 below. - Classical type lobular carcinoma in situ (LCIS) and atypical lobular hyperplasia (ALH). - Radial sclerosing lesion (radial scar), small duct papilloma, cystic/papillary apocrine metaplasia, microscopic perilobular hemangioma, pseudoangiomatous stromal hyperplasia (PASH), and proliferative type fibrocystic changes associated with phosphate microcalcifications present in small ductules. - Combining the above findings with those from the prior needle core biopsy specimen results in the following AJCC 8th Edition Pathologic Stage: pT1b, pNx, pMx.  2. Breast, right superior margin, excision: - Benign fibrofatty breast tissue without histopathologic abnormality. Negative for carcinoma.  3. Breast, right medial margin, excision: - Benign fibrofatty breast tissue without histopathologic abnormality. Negative for carcinoma.  4. Breast, right inferior margin, excision: - Benign fibrofatty breast tissue without histopathologic abnormality.  Negative for carcinoma.  5. Breast, right lateral margin, excision: - Benign fibrofatty breast tissue without histopathologic abnormality. Negative for carcinoma.  6. Breast, right posterior margin, excision: - Benign fibrofatty breast tissue without histopathologic abnormality. Negative for carcinoma.  7. Breast, right anterior margin, excision: - Benign fibrofatty breast tissue without histopathologic abnormality. Negative for carcinoma.  8. Breast, right superior lesion, needle localized lumpectomy: - Atypical lobular hyperplasia (ALH). - Small hyalinized fibroadenoma, nodular cystic/papillary apocrine metaplasia, and proliferative type fibrocystic changes. - Prior biopsy site changes.  9. Breast, left mass, needle localized lumpectomy: - Large complex sclerosing lesion (radial scar) located adjacent to prior biopsy site changes. - Hyalinized fibroadenoma, nodular cystic/papillary apocrine metaplasia, focal gynecomastia-like hyperplasia associated with pseudoangiomatous stromal hyperplasia (PASH), and proliferative type fibrocystic changes associated with phosphate microcalcifications present in small ductules.   We discussed her pathology in detail. We discussed her margins were negative.  We discussed medical and radiation oncology consults.  We discussed breast density. Increasing breast density has been found to increase ones risk of breast cancer, but at this time, there is no clear indication for additional imaging in this setting, as both US and MRI have not been found to improve survival. One can consider bilateral screening US. However, out of 1000 women screened, the use of routine US will only identify an additional 3-5 cancers. The use of US was found to increase the likelihood of undergoing more imaging and more biopsies. She does have dense breasts.   All questions and concerns were answered in detail.  Referral to medical oncology. Referral to radiation oncology, Patient is for bilateral mmg/us in 111/2025, She is for follow up after imaging, pending any interval changes.  Total time spent on encounter was greater than 20 minutes, which included face to face time with the patient, performing an exam, reviewing previous medical records, reviewing current imaging/ pathology, documenting in patient record and coordinating care/imaging. Greater than 50% of the encounter was spent on counseling and coordination of her breast issue.

## 2025-05-20 NOTE — HISTORY OF PRESENT ILLNESS
[FreeTextEntry1] : CHEKO MTZ is a 70-year-old female patient who presents for right breast cancer (+/+/-), right ALH and left radial scar s/p bilateral lumpectomies 5/7/25: right= invasive moderately differentiated ductal carcinoma, 3.0 mm; Right ALH.  pT1b, pNx, pMx; Left = radial scar  Family hx - breast ca - mother.  Her work-up is as follows: B/L Screening Mammo - 01/16/2025: -The breast(s) is/are heterogeneously dense, which may obscure small masses. -There is a focal asymmetry in the lower central right breast posterior depth. -There is an asymmetry in the lateral right breast mid depth. -There is an asymmetry in the slightly lateral left breast posterior depth. BI-RADS 0: INCOMPLETE - NEED ADDITIONAL IMAGING EVALUATION  B/L Dx Mammo & Sono - 02/13/2025: -In the right breast at 6:00 location there is an irregular mass which persists and additional imaging.  -The previously questioned asymmetry in the outer left breast partially persists on additional imaging  US BREAST COMPLETE BILATERAL -In the right breast at 6:00 location 8 cm from the nipple there is a hypoechoic irregular mass measuring 0.4 x 0.5 x 0.6 cm.  Ultrasound core biopsy is recommended for further evaluation. -In the left breast at 3:00 location 9 cm from the nipple corresponding to the mammographic abnormality there is a benign cyst measuring 0.6 cm. -No suspicious lymph nodes were seen in either axilla. 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 Surgical consultation and oncologic management of the right breast(s) are recommended.  4/7/25: MRI --> BIRADS 4 RIGHT BREAST: 0.9 x 0.7 x 0.9 cm index carcinoma with adjacent nonmass enhancement and associated biopsy clip ([id number], 6-84). 0.5 cm enhancing focus with suspicious enhancement kinetics, superior central breast ([id number], 6-8 5). MRI guided biopsy is recommended. Multiple additional areas of similar non-mass enhancement in both breasts, with a dominant area of non-mass enhancement on the left (see below).  LEFT BREAST: Multiple similar appearing areas of non-mass enhancement in both breasts; representative biopsy of the most dominant area of non-mass enhancement in the lateral left breast is recommended ([id number], 6-20).  4/15/25: MRI Bx, B 1.  BREAST, RIGHT ENHANCEMENT, MRI GUIDED NEEDLE CORE BIOPSIES: - LOBULAR CARCINOMA IN SITU (LCIS), CLASSICAL TYPE. - ATROPHIC BREAST TISSUE WITH PROLIFERATIVE TYPE FIBROCYSTIC CHANGES ASSOCIATED WITH PHOSPHATE MICROCALCIFICATIONS PRESENT IN SMALL DUCTULES.  2.  BREAST, LEFT ENHANCEMENT, MRI GUIDED NEEDLE CORE BIOPSIES: - RADIAL SCLEROSING LESION (RADIAL SCAR). - ATROPHIC BREAST TISSUE WITH PROLIFERATIVE TYPE FIBROCYSTIC CHANGES INCLUDING FLORID DUCT HYPERPLASIA, STROMAL FIBROSIS WITH FOCI OF PSEUDOANGIOMATOUS STROMAL HYPERPLASIA (PASH), SCLEROSING ADENOSIS, APOCRINE METAPLASIA, AND PHOSPHATE MICROCALCIFICATIONS PRESENT IN SMALL DUCTULES.  5/7/2025 1.  Breast, right 6:00 N8 mass, needle localized lumpectomy: - Prior biopsy site changes with invasive moderately differentiated ductal carcinoma, 3.0 mm (microscopic measurement). - Neither an intraductal component nor lymphovascular invasion is identified. - For final surgical margin status please see specimen parts #2-#7 below. - Classical type lobular carcinoma in situ (LCIS) and atypical lobular hyperplasia (ALH). - Radial sclerosing lesion (radial scar), small duct papilloma, cystic/papillary apocrine metaplasia, microscopic perilobular hemangioma, pseudoangiomatous stromal hyperplasia (PASH), and proliferative type fibrocystic changes associated with phosphate microcalcifications present in small ductules. - Combining the above findings with those from the prior needle core biopsy specimen results in the following AJCC 8th Edition Pathologic Stage: pT1b, pNx, pMx.  2.  Breast, right superior margin, excision: - Benign fibrofatty breast tissue without histopathologic abnormality. Negative for carcinoma.  3.  Breast, right medial margin, excision: - Benign fibrofatty breast tissue without histopathologic abnormality. Negative for carcinoma.  4.  Breast, right inferior margin, excision: - Benign fibrofatty breast tissue without histopathologic abnormality.  Negative for carcinoma.  5.  Breast, right lateral margin, excision: - Benign fibrofatty breast tissue without histopathologic abnormality. Negative for carcinoma.  6.  Breast, right posterior margin, excision: - Benign fibrofatty breast tissue without histopathologic abnormality. Negative for carcinoma.  7.  Breast, right anterior margin, excision: - Benign fibrofatty breast tissue without histopathologic abnormality. Negative for carcinoma.  8.  Breast, right superior lesion, needle localized lumpectomy: - Atypical lobular hyperplasia (ALH). - Small hyalinized fibroadenoma, nodular cystic/papillary apocrine metaplasia, and proliferative type fibrocystic changes. - Prior biopsy site changes.  9.  Breast, left mass, needle localized lumpectomy: - Large complex sclerosing lesion (radial scar) located adjacent to prior biopsy site changes. - Hyalinized fibroadenoma, nodular cystic/papillary apocrine metaplasia, focal gynecomastia-like hyperplasia associated with pseudoangiomatous stromal hyperplasia (PASH), and proliferative type fibrocystic changes associated with phosphate microcalcifications present in small ductules.   Healing well.

## 2025-05-20 NOTE — HISTORY OF PRESENT ILLNESS
[FreeTextEntry1] : CHEKO MTZ is a 70-year-old female patient who presents for right breast cancer (+/+/-), right ALH and left radial scar s/p bilateral lumpectomies 5/7/25: right= invasive moderately differentiated ductal carcinoma, 3.0 mm; Right ALH.  pT1b, pNx, pMx; Left = radial scar  Family hx - breast ca - mother.  Her work-up is as follows: B/L Screening Mammo - 01/16/2025: -The breast(s) is/are heterogeneously dense, which may obscure small masses. -There is a focal asymmetry in the lower central right breast posterior depth. -There is an asymmetry in the lateral right breast mid depth. -There is an asymmetry in the slightly lateral left breast posterior depth. BI-RADS 0: INCOMPLETE - NEED ADDITIONAL IMAGING EVALUATION  B/L Dx Mammo & Sono - 02/13/2025: -In the right breast at 6:00 location there is an irregular mass which persists and additional imaging.  -The previously questioned asymmetry in the outer left breast partially persists on additional imaging  US BREAST COMPLETE BILATERAL -In the right breast at 6:00 location 8 cm from the nipple there is a hypoechoic irregular mass measuring 0.4 x 0.5 x 0.6 cm.  Ultrasound core biopsy is recommended for further evaluation. -In the left breast at 3:00 location 9 cm from the nipple corresponding to the mammographic abnormality there is a benign cyst measuring 0.6 cm. -No suspicious lymph nodes were seen in either axilla. 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 Surgical consultation and oncologic management of the right breast(s) are recommended.  4/7/25: MRI --> BIRADS 4 RIGHT BREAST: 0.9 x 0.7 x 0.9 cm index carcinoma with adjacent nonmass enhancement and associated biopsy clip (80208-47, 6-84). 0.5 cm enhancing focus with suspicious enhancement kinetics, superior central breast (85120-10, 6-8 5). MRI guided biopsy is recommended. Multiple additional areas of similar non-mass enhancement in both breasts, with a dominant area of non-mass enhancement on the left (see below).  LEFT BREAST: Multiple similar appearing areas of non-mass enhancement in both breasts; representative biopsy of the most dominant area of non-mass enhancement in the lateral left breast is recommended ([id number], 6-20).  4/15/25: MRI Bx, B 1.  BREAST, RIGHT ENHANCEMENT, MRI GUIDED NEEDLE CORE BIOPSIES: - LOBULAR CARCINOMA IN SITU (LCIS), CLASSICAL TYPE. - ATROPHIC BREAST TISSUE WITH PROLIFERATIVE TYPE FIBROCYSTIC CHANGES ASSOCIATED WITH PHOSPHATE MICROCALCIFICATIONS PRESENT IN SMALL DUCTULES.  2.  BREAST, LEFT ENHANCEMENT, MRI GUIDED NEEDLE CORE BIOPSIES: - RADIAL SCLEROSING LESION (RADIAL SCAR). - ATROPHIC BREAST TISSUE WITH PROLIFERATIVE TYPE FIBROCYSTIC CHANGES INCLUDING FLORID DUCT HYPERPLASIA, STROMAL FIBROSIS WITH FOCI OF PSEUDOANGIOMATOUS STROMAL HYPERPLASIA (PASH), SCLEROSING ADENOSIS, APOCRINE METAPLASIA, AND PHOSPHATE MICROCALCIFICATIONS PRESENT IN SMALL DUCTULES.  5/7/2025 1.  Breast, right 6:00 N8 mass, needle localized lumpectomy: - Prior biopsy site changes with invasive moderately differentiated ductal carcinoma, 3.0 mm (microscopic measurement). - Neither an intraductal component nor lymphovascular invasion is identified. - For final surgical margin status please see specimen parts #2-#7 below. - Classical type lobular carcinoma in situ (LCIS) and atypical lobular hyperplasia (ALH). - Radial sclerosing lesion (radial scar), small duct papilloma, cystic/papillary apocrine metaplasia, microscopic perilobular hemangioma, pseudoangiomatous stromal hyperplasia (PASH), and proliferative type fibrocystic changes associated with phosphate microcalcifications present in small ductules. - Combining the above findings with those from the prior needle core biopsy specimen results in the following AJCC 8th Edition Pathologic Stage: pT1b, pNx, pMx.  2.  Breast, right superior margin, excision: - Benign fibrofatty breast tissue without histopathologic abnormality. Negative for carcinoma.  3.  Breast, right medial margin, excision: - Benign fibrofatty breast tissue without histopathologic abnormality. Negative for carcinoma.  4.  Breast, right inferior margin, excision: - Benign fibrofatty breast tissue without histopathologic abnormality.  Negative for carcinoma.  5.  Breast, right lateral margin, excision: - Benign fibrofatty breast tissue without histopathologic abnormality. Negative for carcinoma.  6.  Breast, right posterior margin, excision: - Benign fibrofatty breast tissue without histopathologic abnormality. Negative for carcinoma.  7.  Breast, right anterior margin, excision: - Benign fibrofatty breast tissue without histopathologic abnormality. Negative for carcinoma.  8.  Breast, right superior lesion, needle localized lumpectomy: - Atypical lobular hyperplasia (ALH). - Small hyalinized fibroadenoma, nodular cystic/papillary apocrine metaplasia, and proliferative type fibrocystic changes. - Prior biopsy site changes.  9.  Breast, left mass, needle localized lumpectomy: - Large complex sclerosing lesion (radial scar) located adjacent to prior biopsy site changes. - Hyalinized fibroadenoma, nodular cystic/papillary apocrine metaplasia, focal gynecomastia-like hyperplasia associated with pseudoangiomatous stromal hyperplasia (PASH), and proliferative type fibrocystic changes associated with phosphate microcalcifications present in small ductules.   Healing well.

## 2025-05-20 NOTE — DATA REVIEWED
[FreeTextEntry1] : 5/7/2025 15:44 EDT  Surgical Pathology Report - Auth (Verified)  Specimen(s) Submitted 1  Right breast mass Time obtained: 2:30 pm Cold ischemic time <1 hour 2  Right breast superior margin Time obtained: 2:32 pm Cold ischemic time <1 hour 3  Right breast medial margin Time obtained: 2:33 pm Cold ischemic time <1 hour 4  Right breast inferior margin Time obtained: 2:34 pm Cold ischemic time <1 hour 5  Right breast lateral margin Time obtained: 2:35 pm Cold ischemic time <1 hour 6  Right breast posterior margin Time obtained: 2:36 pm Cold ischemic time <1 hour 7  Right breast anterior margin Time obtained: 2:37 pm Cold ischemic time <1 hour 8  Right breast superior lesion  Time obtained: 3:01 pm Cold ischemic time <1 hour 9  Left breast mass Time obtained: 3:16 pm Cold ischemic time <1 hour  Final Diagnosis 1.  Breast, right 6:00 N8 mass, needle localized lumpectomy: - Prior biopsy site changes with invasive moderately differentiated ductal carcinoma, 3.0 mm (microscopic measurement). - Neither an intraductal component nor lymphovascular invasion is identified. - For final surgical margin status please see specimen parts #2-#7 below. - Classical type lobular carcinoma in situ (LCIS) and atypical lobular hyperplasia (ALH). - Radial sclerosing lesion (radial scar), small duct papilloma, cystic/papillary apocrine metaplasia, microscopic perilobular hemangioma, pseudoangiomatous stromal hyperplasia (PASH), and proliferative type fibrocystic changes associated with phosphate microcalcifications present in small ductules. - For hormone receptor and oncoprotein expression status please see the pathology report from the prior needle core biopsy specimen ([id number]). - Combining the above findings with those from the prior needle core biopsy specimen results in the following AJCC 8th Edition Pathologic Stage: pT1b, pNx, pMx.  Comment: Review of the prior needle core biopsy ([id number]) reveals the invasive tumor measuring 7.0 mm (microscopic measurement) in the biopsy material.  2.  Breast, right superior margin, excision: - Benign fibrofatty breast tissue without histopathologic abnormality. Negative for carcinoma.  3.  Breast, right medial margin, excision: - Benign fibrofatty breast tissue without histopathologic abnormality. Negative for carcinoma.  4.  Breast, right inferior margin, excision: - Benign fibrofatty breast tissue without histopathologic abnormality.  Negative for carcinoma.  5.  Breast, right lateral margin, excision: - Benign fibrofatty breast tissue without histopathologic abnormality. Negative for carcinoma.  6.  Breast, right posterior margin, excision: - Benign fibrofatty breast tissue without histopathologic abnormality. Negative for carcinoma.  7.  Breast, right anterior margin, excision: - Benign fibrofatty breast tissue without histopathologic abnormality. Negative for carcinoma.  8.  Breast, right superior lesion, needle localized lumpectomy: - Atypical lobular hyperplasia (ALH). - Small hyalinized fibroadenoma, nodular cystic/papillary apocrine metaplasia, and proliferative type fibrocystic changes. - Prior biopsy site changes.  9.  Breast, left mass, needle localized lumpectomy: - Large complex sclerosing lesion (radial scar) located adjacent to prior biopsy site changes. - Hyalinized fibroadenoma, nodular cystic/papillary apocrine metaplasia, focal gynecomastia-like hyperplasia associated with pseudoangiomatous stromal hyperplasia (PASH), and proliferative type fibrocystic changes associated with phosphate microcalcifications present in small ductules.

## 2025-05-20 NOTE — PHYSICAL EXAM
[Normocephalic] : normocephalic [EOMI] : extra ocular movement intact [No Rashes] : no rashes [No Ulceration] : no ulceration [de-identified] : NL respirations [de-identified] : incision site healing well with no signs of infection/dehiscence [de-identified] : incision site healing well with no signs of infection/dehiscence

## 2025-05-20 NOTE — PHYSICAL EXAM
[Normocephalic] : normocephalic [EOMI] : extra ocular movement intact [No Rashes] : no rashes [No Ulceration] : no ulceration [de-identified] : NL respirations [de-identified] : incision site healing well with no signs of infection/dehiscence [de-identified] : incision site healing well with no signs of infection/dehiscence

## 2025-05-28 NOTE — HISTORY OF PRESENT ILLNESS
[FreeTextEntry1] : CHEKO MTZ is a 70-year-old female patient who presents for right breast cancer (+/+/-), right ALH and left radial scar s/p bilateral lumpectomies 5/7/25: right= invasive moderately differentiated ductal carcinoma, 3.0 mm; Right ALH. pT1b, pNx, pMx; Left = radial scar.  Family hx - breast ca - mother.  Her work-up is as follows: B/L Screening Mammo - 01/16/2025: -The breast(s) is/are heterogeneously dense, which may obscure small masses. -There is a focal asymmetry in the lower central right breast posterior depth. -There is an asymmetry in the lateral right breast mid depth. -There is an asymmetry in the slightly lateral left breast posterior depth. BI-RADS 0: INCOMPLETE - NEED ADDITIONAL IMAGING EVALUATION  B/L Dx Mammo & Sono - 02/13/2025: -In the right breast at 6:00 location there is an irregular mass which persists and additional imaging. -The previously questioned asymmetry in the outer left breast partially persists on additional imaging US BREAST COMPLETE BILATERAL -In the right breast at 6:00 location 8 cm from the nipple there is a hypoechoic irregular mass measuring 0.4 x 0.5 x 0.6 cm. Ultrasound core biopsy is recommended for further evaluation. -In the left breast at 3:00 location 9 cm from the nipple corresponding to the mammographic abnormality there is a benign cyst measuring 0.6 cm. -No suspicious lymph nodes were seen in either axilla. 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 Surgical consultation and oncologic management of the right breast(s) are recommended.  4/7/25: MRI --> BIRADS 4 RIGHT BREAST: 0.9 x 0.7 x 0.9 cm index carcinoma with adjacent nonmass enhancement and associated biopsy clip ([id number], 6-84). 0.5 cm enhancing focus with suspicious enhancement kinetics, superior central breast (87961-74, 6-8 5). MRI guided biopsy is recommended. Multiple additional areas of similar non-mass enhancement in both breasts, with a dominant area of non-mass enhancement on the left (see below).  LEFT BREAST: Multiple similar appearing areas of non-mass enhancement in both breasts; representative biopsy of the most dominant area of non-mass enhancement in the lateral left breast is recommended (07053-65, 6-20).  4/15/25: MRI Bx, B 1. BREAST, RIGHT ENHANCEMENT, MRI GUIDED NEEDLE CORE BIOPSIES: - LOBULAR CARCINOMA IN SITU (LCIS), CLASSICAL TYPE. - ATROPHIC BREAST TISSUE WITH PROLIFERATIVE TYPE FIBROCYSTIC CHANGES ASSOCIATED WITH PHOSPHATE MICROCALCIFICATIONS PRESENT IN SMALL DUCTULES.  2. BREAST, LEFT ENHANCEMENT, MRI GUIDED NEEDLE CORE BIOPSIES: - RADIAL SCLEROSING LESION (RADIAL SCAR). - ATROPHIC BREAST TISSUE WITH PROLIFERATIVE TYPE FIBROCYSTIC CHANGES INCLUDING FLORID DUCT HYPERPLASIA, STROMAL FIBROSIS WITH FOCI OF PSEUDOANGIOMATOUS STROMAL HYPERPLASIA (PASH), SCLEROSING ADENOSIS, APOCRINE METAPLASIA, AND PHOSPHATE MICROCALCIFICATIONS PRESENT IN SMALL DUCTULES.  5/7/2025 1. Breast, right 6:00 N8 mass, needle localized lumpectomy: - Prior biopsy site changes with invasive moderately differentiated ductal carcinoma, 3.0 mm (microscopic measurement). - Neither an intraductal component nor lymphovascular invasion is identified. - For final surgical margin status please see specimen parts #2-#7 below. - Classical type lobular carcinoma in situ (LCIS) and atypical lobular hyperplasia (ALH). - Radial sclerosing lesion (radial scar), small duct papilloma, cystic/papillary apocrine metaplasia, microscopic perilobular hemangioma, pseudoangiomatous stromal hyperplasia (PASH), and proliferative type fibrocystic changes associated with phosphate microcalcifications present in small ductules. - Combining the above findings with those from the prior needle core biopsy specimen results in the following AJCC 8th Edition Pathologic Stage: pT1b, pNx, pMx.  2. Breast, right superior margin, excision: - Benign fibrofatty breast tissue without histopathologic abnormality. Negative for carcinoma.  3. Breast, right medial margin, excision: - Benign fibrofatty breast tissue without histopathologic abnormality. Negative for carcinoma.  4. Breast, right inferior margin, excision: - Benign fibrofatty breast tissue without histopathologic abnormality.  Negative for carcinoma.  5. Breast, right lateral margin, excision: - Benign fibrofatty breast tissue without histopathologic abnormality. Negative for carcinoma.  6. Breast, right posterior margin, excision: - Benign fibrofatty breast tissue without histopathologic abnormality. Negative for carcinoma.  7. Breast, right anterior margin, excision: - Benign fibrofatty breast tissue without histopathologic abnormality. Negative for carcinoma.  8. Breast, right superior lesion, needle localized lumpectomy: - Atypical lobular hyperplasia (ALH). - Small hyalinized fibroadenoma, nodular cystic/papillary apocrine metaplasia, and proliferative type fibrocystic changes. - Prior biopsy site changes.  9. Breast, left mass, needle localized lumpectomy: - Large complex sclerosing lesion (radial scar) located adjacent to prior biopsy site changes. - Hyalinized fibroadenoma, nodular cystic/papillary apocrine metaplasia, focal gynecomastia-like hyperplasia associated with pseudoangiomatous stromal hyperplasia (PASH), and proliferative type fibrocystic changes associated with phosphate microcalcifications present in small ductules.

## 2025-05-28 NOTE — HISTORY OF PRESENT ILLNESS
[FreeTextEntry1] : CHEKO MTZ is a 70-year-old female patient who presents for right breast cancer (+/+/-), right ALH and left radial scar s/p bilateral lumpectomies 5/7/25: right= invasive moderately differentiated ductal carcinoma, 3.0 mm; Right ALH. pT1b, pNx, pMx; Left = radial scar.  Family hx - breast ca - mother.  Her work-up is as follows: B/L Screening Mammo - 01/16/2025: -The breast(s) is/are heterogeneously dense, which may obscure small masses. -There is a focal asymmetry in the lower central right breast posterior depth. -There is an asymmetry in the lateral right breast mid depth. -There is an asymmetry in the slightly lateral left breast posterior depth. BI-RADS 0: INCOMPLETE - NEED ADDITIONAL IMAGING EVALUATION  B/L Dx Mammo & Sono - 02/13/2025: -In the right breast at 6:00 location there is an irregular mass which persists and additional imaging. -The previously questioned asymmetry in the outer left breast partially persists on additional imaging US BREAST COMPLETE BILATERAL -In the right breast at 6:00 location 8 cm from the nipple there is a hypoechoic irregular mass measuring 0.4 x 0.5 x 0.6 cm. Ultrasound core biopsy is recommended for further evaluation. -In the left breast at 3:00 location 9 cm from the nipple corresponding to the mammographic abnormality there is a benign cyst measuring 0.6 cm. -No suspicious lymph nodes were seen in either axilla. 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 Surgical consultation and oncologic management of the right breast(s) are recommended.  4/7/25: MRI --> BIRADS 4 RIGHT BREAST: 0.9 x 0.7 x 0.9 cm index carcinoma with adjacent nonmass enhancement and associated biopsy clip ([id number], 6-84). 0.5 cm enhancing focus with suspicious enhancement kinetics, superior central breast ([id number], 6-8 5). MRI guided biopsy is recommended. Multiple additional areas of similar non-mass enhancement in both breasts, with a dominant area of non-mass enhancement on the left (see below).  LEFT BREAST: Multiple similar appearing areas of non-mass enhancement in both breasts; representative biopsy of the most dominant area of non-mass enhancement in the lateral left breast is recommended ([id number], 6-20).  4/15/25: MRI Bx, B 1. BREAST, RIGHT ENHANCEMENT, MRI GUIDED NEEDLE CORE BIOPSIES: - LOBULAR CARCINOMA IN SITU (LCIS), CLASSICAL TYPE. - ATROPHIC BREAST TISSUE WITH PROLIFERATIVE TYPE FIBROCYSTIC CHANGES ASSOCIATED WITH PHOSPHATE MICROCALCIFICATIONS PRESENT IN SMALL DUCTULES.  2. BREAST, LEFT ENHANCEMENT, MRI GUIDED NEEDLE CORE BIOPSIES: - RADIAL SCLEROSING LESION (RADIAL SCAR). - ATROPHIC BREAST TISSUE WITH PROLIFERATIVE TYPE FIBROCYSTIC CHANGES INCLUDING FLORID DUCT HYPERPLASIA, STROMAL FIBROSIS WITH FOCI OF PSEUDOANGIOMATOUS STROMAL HYPERPLASIA (PASH), SCLEROSING ADENOSIS, APOCRINE METAPLASIA, AND PHOSPHATE MICROCALCIFICATIONS PRESENT IN SMALL DUCTULES.  5/7/2025 1. Breast, right 6:00 N8 mass, needle localized lumpectomy: - Prior biopsy site changes with invasive moderately differentiated ductal carcinoma, 3.0 mm (microscopic measurement). - Neither an intraductal component nor lymphovascular invasion is identified. - For final surgical margin status please see specimen parts #2-#7 below. - Classical type lobular carcinoma in situ (LCIS) and atypical lobular hyperplasia (ALH). - Radial sclerosing lesion (radial scar), small duct papilloma, cystic/papillary apocrine metaplasia, microscopic perilobular hemangioma, pseudoangiomatous stromal hyperplasia (PASH), and proliferative type fibrocystic changes associated with phosphate microcalcifications present in small ductules. - Combining the above findings with those from the prior needle core biopsy specimen results in the following AJCC 8th Edition Pathologic Stage: pT1b, pNx, pMx.  2. Breast, right superior margin, excision: - Benign fibrofatty breast tissue without histopathologic abnormality. Negative for carcinoma.  3. Breast, right medial margin, excision: - Benign fibrofatty breast tissue without histopathologic abnormality. Negative for carcinoma.  4. Breast, right inferior margin, excision: - Benign fibrofatty breast tissue without histopathologic abnormality.  Negative for carcinoma.  5. Breast, right lateral margin, excision: - Benign fibrofatty breast tissue without histopathologic abnormality. Negative for carcinoma.  6. Breast, right posterior margin, excision: - Benign fibrofatty breast tissue without histopathologic abnormality. Negative for carcinoma.  7. Breast, right anterior margin, excision: - Benign fibrofatty breast tissue without histopathologic abnormality. Negative for carcinoma.  8. Breast, right superior lesion, needle localized lumpectomy: - Atypical lobular hyperplasia (ALH). - Small hyalinized fibroadenoma, nodular cystic/papillary apocrine metaplasia, and proliferative type fibrocystic changes. - Prior biopsy site changes.  9. Breast, left mass, needle localized lumpectomy: - Large complex sclerosing lesion (radial scar) located adjacent to prior biopsy site changes. - Hyalinized fibroadenoma, nodular cystic/papillary apocrine metaplasia, focal gynecomastia-like hyperplasia associated with pseudoangiomatous stromal hyperplasia (PASH), and proliferative type fibrocystic changes associated with phosphate microcalcifications present in small ductules.

## 2025-05-28 NOTE — END OF VISIT
[Time Spent: ___ minutes] : I have spent [unfilled] minutes of time on the encounter which excludes teaching and separately reported services. [FreeTextEntry3] : MD Note: I personally performed the evaluation and management services for this patient. This includes conducting the examination, assessing all conditions, and establishing the plan of care. Today, my ACP, Simin Scott, was here to observe my evaluation and management services for this patient. I agree with the documentation above, which I have reviewed and edited where appropriate.

## 2025-05-28 NOTE — DISEASE MANAGEMENT
[Pathological] : TNM Stage: p [FreeTextEntry4] : right breast IDC, ER+, TX+, Her2 negative [TTNM] : 1b [NTNM] : x [MTNM] : x [IA] : IA

## 2025-05-28 NOTE — PHYSICAL EXAM
[General Appearance - Well Developed] : well developed [General Appearance - Alert] : alert [General Appearance - In No Acute Distress] : in no acute distress [] : no respiratory distress [Oriented To Time, Place, And Person] : oriented to person, place, and time [Normal] : no focal deficits [de-identified] : bilateral lumpectomies scars

## 2025-05-28 NOTE — REVIEW OF SYSTEMS
[Patient Intake Form Reviewed] : Patient intake form was reviewed [Fatigue] : fatigue [Joint Pain] : joint pain [Anxiety] : anxiety [Negative] : Allergic/Immunologic [Gait Disturbance] : no gait disturbance [Difficulty Walking] : no difficulty walking [Depression] : no depression [FreeTextEntry2] : mild fatigue since Surgery [FreeTextEntry3] : glasses [FreeTextEntry5] : HTN [de-identified] : disc disease/ spinal stenosis/  [FreeTextEntry9] : disc disease/ spinal stenosis/

## 2025-05-28 NOTE — DISEASE MANAGEMENT
[Pathological] : TNM Stage: p [FreeTextEntry4] : right breast IDC, ER+, RI+, Her2 negative [TTNM] : 1b [NTNM] : x [MTNM] : x [IA] : IA

## 2025-05-28 NOTE — PHYSICAL EXAM
[General Appearance - Well Developed] : well developed [General Appearance - Alert] : alert [General Appearance - In No Acute Distress] : in no acute distress [] : no respiratory distress [Oriented To Time, Place, And Person] : oriented to person, place, and time [Normal] : no focal deficits [de-identified] : bilateral lumpectomies scars

## 2025-06-12 NOTE — BEGINNING OF VISIT
[1] : 1) Little interest or pleasure doing things for several days (1) [0] : 2) Feeling down, depressed, or hopeless: Not at all (0) [[id number]] : 1 [Former] : Former [> 15 Years] : > 15 Years [Reviewed, no changes] : Reviewed, no changes

## 2025-06-12 NOTE — BEGINNING OF VISIT
[1] : 1) Little interest or pleasure doing things for several days (1) [0] : 2) Feeling down, depressed, or hopeless: Not at all (0) [[id number]] : 1 [Former] : Former [> 15 Years] : > 15 Years [Reviewed, no changes] : Reviewed, no changes

## 2025-06-12 NOTE — BEGINNING OF VISIT
[1] : 1) Little interest or pleasure doing things for several days (1) [0] : 2) Feeling down, depressed, or hopeless: Not at all (0) [[id number]] : 1 [Former] : Former [> 15 Years] : > 15 Years [Reviewed, no changes] : Reviewed, no changes

## 2025-06-12 NOTE — BEGINNING OF VISIT
[1] : 1) Little interest or pleasure doing things for several days (1) [0] : 2) Feeling down, depressed, or hopeless: Not at all (0) [[id number]] : 1 [Former] : Former [> 15 Years] : > 15 Years [Reviewed, no changes] : Reviewed, no changes

## 2025-06-13 NOTE — HISTORY OF PRESENT ILLNESS
[de-identified] : 70-year-old female is referred by Dr James for right breast cancer (+/+/-), right cLCIS and left radial scar, s/p bilateral lumpectomy on 5/7/25.  Her work-up is as follows: B/L Screening Mammo - 01/16/2025: -The breast(s) is/are heterogeneously dense, which may obscure small masses. -There is a focal asymmetry in the lower central right breast posterior depth. -There is an asymmetry in the lateral right breast mid depth. -There is an asymmetry in the slightly lateral left breast posterior depth. BI-RADS 0: INCOMPLETE - NEED ADDITIONAL IMAGING EVALUATION  B/L Dx Mammo & Sono - 02/13/2025: -In the right breast at 6:00 location there is an irregular mass which persists and additional imaging. -The previously questioned asymmetry in the outer left breast partially persists on additional imaging US BREAST COMPLETE BILATERAL -In the right breast at 6:00 location 8 cm from the nipple there is a hypoechoic irregular mass measuring 0.4 x 0.5 x 0.6 cm. Ultrasound core biopsy is recommended for further evaluation. -In the left breast at 3:00 location 9 cm from the nipple corresponding to the mammographic abnormality there is a benign cyst measuring 0.6 cm. -No suspicious lymph nodes were seen in either axilla. BI-RADS 4: SUSPICIOUS  US Guided Core Bx - 03/17/2025: Right, 6:00 N8, 0.6cm: (tophat) - Invasive ductal carcinoma, moderately differentiated, ER/TN pos %, Her-2 negative (1+ by IHC), Ki 67 5%.  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 0.5 cm enhancing focus with suspicious enhancement kinetics, superior central breast ([id number], 6-8 5). MRI guided biopsy is recommended. Multiple additional areas of similar non-mass enhancement in both breasts, with a dominant area of non-mass enhancement on the left (see below).  LEFT BREAST: Multiple similar appearing areas of non-mass enhancement in both breasts; representative biopsy of the most dominant area of non-mass enhancement in the lateral left breast is recommended.  4/15/25: MRI guided biopsy: 1. BREAST, RIGHT ENHANCEMENT, MRI GUIDED NEEDLE CORE BIOPSIES: - LOBULAR CARCINOMA IN SITU (LCIS), CLASSICAL TYPE. - ATROPHIC BREAST TISSUE WITH PROLIFERATIVE TYPE FIBROCYSTIC CHANGES ASSOCIATED WITH PHOSPHATE MICROCALCIFICATIONS PRESENT IN SMALL DUCTULES.  2. BREAST, LEFT ENHANCEMENT, MRI GUIDED NEEDLE CORE BIOPSIES: - RADIAL SCLEROSING LESION (RADIAL SCAR). - ATROPHIC BREAST TISSUE WITH PROLIFERATIVE TYPE FIBROCYSTIC CHANGES INCLUDING FLORID DUCT HYPERPLASIA, STROMAL FIBROSIS WITH FOCI OF PSEUDOANGIOMATOUS STROMAL HYPERPLASIA (PASH), SCLEROSING ADENOSIS, APOCRINE METAPLASIA, AND PHOSPHATE MICROCALCIFICATIONS PRESENT IN SMALL DUCTULES.  On 5/7/2025, she underwent b/l breast lumpectomy: Breast, right 6:00 N8 mass, needle localized lumpectomy: - Prior biopsy site changes with invasive moderately differentiated ductal carcinoma, 3.0 mm (microscopic measurement). - Neither an intraductal component nor lymphovascular invasion is identified. - All surgical margins are negative.   - Classical type lobular carcinoma in situ (LCIS) and atypical lobular hyperplasia (ALH). - Radial sclerosing lesion (radial scar), small duct papilloma, cystic/papillary apocrine metaplasia, microscopic perilobular hemangioma, pseudoangiomatous stromal hyperplasia (PASH), and proliferative type fibrocystic changes associated with phosphate microcalcifications present in small ductules. - Combining the above findings with those from the prior needle core biopsy specimen results in the following AJCC 8th Edition Pathologic Stage: pT1b, pNx, pMx.  Breast, left mass, needle localized lumpectomy: - Large complex sclerosing lesion (radial scar) located adjacent to prior biopsy site changes. - Hyalinized fibroadenoma, nodular cystic/papillary apocrine metaplasia, focal gynecomastia-like hyperplasia associated with pseudoangiomatous stromal hyperplasia (PASH), and proliferative type fibrocystic changes associated with phosphate microcalcifications present in small ductules.  She recovered well from surgery. She has family h/o breast cancer in her mother diagnosed at 50's.

## 2025-06-13 NOTE — ASSESSMENT
[FreeTextEntry1] : 69 yo female has stage IA ([id number]) IDC of the right breast, G2, ER/CO positive, Her-2 negative, cLCIS and complex sclerosing lesion in the left breast, s/p b/l lumpectomy.   Assessment and Plan: We had a detailed discussion regarding to her diagnosis, stage, prognosis and adjuvant systemic therapy. The pathology report was reviewed. Renetta has early stage hormone receptor positive breast cancer with 3 mm small size of tumor, favorable pathologic features. Her risk of distant recurrence is low. She is recommended for adjuvant endocrine therapy.  We reviewed benefit and side effects of adjuvant endocrine therapy with AI. Anastrozole 1 mg daily for 5 years is recommended. The potential sided effects may include but not limit to muscular and skeletal symptoms, arthralgia, increasing osteopenia and osteoporosis, a small increased risk of cardiovascular events and slight increase of hyperlipidemia. We also discussed the option to take Tamoxifen since she is concerning about bone density issue with AI. The side effects of THEODORE were reviewed which may include but not limit to small increased risk of cardiovascular events, blood clots and endometrial cancer, hot flashes, vasomotor symptoms, depression and cataract. She opted to take Anastrozole.   We discussed bone health management. She had bone density on 1/16/25 which showed osteopenia in the femoral neck L and femoral neck R with T score -1.6 and -1.3 respectively. The spine and hip are normal. She is recommended to take calcium and vitamin D supplement, and regular exercise.   She saw Dr. Chan and was recommended to have adjuvant RT 26Gy/5fx. She will start Anastrozole after completion of RT. A script was sent to her pharmacy.   All questions were answered appropriately. She agreed with the plan.   RTO for followup in 3 months.

## 2025-06-13 NOTE — PHYSICAL EXAM
[Fully active, able to carry on all pre-disease performance without restriction] : Status 0 - Fully active, able to carry on all pre-disease performance without restriction [Normal] : affect appropriate [de-identified] : S/P b/l lumpectomy. Surgical incisions are healing well.

## 2025-06-13 NOTE — ASSESSMENT
[FreeTextEntry1] : 71 yo female has stage IA ([id number]) IDC of the right breast, G2, ER/OH positive, Her-2 negative, cLCIS and complex sclerosing lesion in the left breast, s/p b/l lumpectomy.   Assessment and Plan: We had a detailed discussion regarding to her diagnosis, stage, prognosis and adjuvant systemic therapy. The pathology report was reviewed. Renetta has early stage hormone receptor positive breast cancer with 3 mm small size of tumor, favorable pathologic features. Her risk of distant recurrence is low. She is recommended for adjuvant endocrine therapy.  We reviewed benefit and side effects of adjuvant endocrine therapy with AI. Anastrozole 1 mg daily for 5 years is recommended. The potential sided effects may include but not limit to muscular and skeletal symptoms, arthralgia, increasing osteopenia and osteoporosis, a small increased risk of cardiovascular events and slight increase of hyperlipidemia. We also discussed the option to take Tamoxifen since she is concerning about bone density issue with AI. The side effects of THEODORE were reviewed which may include but not limit to small increased risk of cardiovascular events, blood clots and endometrial cancer, hot flashes, vasomotor symptoms, depression and cataract. She opted to take Anastrozole.   We discussed bone health management. She had bone density on 1/16/25 which showed osteopenia in the femoral neck L and femoral neck R with T score -1.6 and -1.3 respectively. The spine and hip are normal. She is recommended to take calcium and vitamin D supplement, and regular exercise.   She saw Dr. Chan and was recommended to have adjuvant RT 26Gy/5fx. She will start Anastrozole after completion of RT. A script was sent to her pharmacy.   All questions were answered appropriately. She agreed with the plan.   RTO for followup in 3 months.

## 2025-06-13 NOTE — PHYSICAL EXAM
[Fully active, able to carry on all pre-disease performance without restriction] : Status 0 - Fully active, able to carry on all pre-disease performance without restriction [Normal] : affect appropriate [de-identified] : S/P b/l lumpectomy. Surgical incisions are healing well.

## 2025-06-13 NOTE — HISTORY OF PRESENT ILLNESS
[de-identified] : 70-year-old female is referred by Dr James for right breast cancer (+/+/-), right cLCIS and left radial scar, s/p bilateral lumpectomy on 5/7/25.  Her work-up is as follows: B/L Screening Mammo - 01/16/2025: -The breast(s) is/are heterogeneously dense, which may obscure small masses. -There is a focal asymmetry in the lower central right breast posterior depth. -There is an asymmetry in the lateral right breast mid depth. -There is an asymmetry in the slightly lateral left breast posterior depth. BI-RADS 0: INCOMPLETE - NEED ADDITIONAL IMAGING EVALUATION  B/L Dx Mammo & Sono - 02/13/2025: -In the right breast at 6:00 location there is an irregular mass which persists and additional imaging. -The previously questioned asymmetry in the outer left breast partially persists on additional imaging US BREAST COMPLETE BILATERAL -In the right breast at 6:00 location 8 cm from the nipple there is a hypoechoic irregular mass measuring 0.4 x 0.5 x 0.6 cm. Ultrasound core biopsy is recommended for further evaluation. -In the left breast at 3:00 location 9 cm from the nipple corresponding to the mammographic abnormality there is a benign cyst measuring 0.6 cm. -No suspicious lymph nodes were seen in either axilla. BI-RADS 4: SUSPICIOUS  US Guided Core Bx - 03/17/2025: Right, 6:00 N8, 0.6cm: (tophat) - Invasive ductal carcinoma, moderately differentiated, ER/WA pos %, Her-2 negative (1+ by IHC), Ki 67 5%.  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 0.5 cm enhancing focus with suspicious enhancement kinetics, superior central breast ([id number], 6-8 5). MRI guided biopsy is recommended. Multiple additional areas of similar non-mass enhancement in both breasts, with a dominant area of non-mass enhancement on the left (see below).  LEFT BREAST: Multiple similar appearing areas of non-mass enhancement in both breasts; representative biopsy of the most dominant area of non-mass enhancement in the lateral left breast is recommended.  4/15/25: MRI guided biopsy: 1. BREAST, RIGHT ENHANCEMENT, MRI GUIDED NEEDLE CORE BIOPSIES: - LOBULAR CARCINOMA IN SITU (LCIS), CLASSICAL TYPE. - ATROPHIC BREAST TISSUE WITH PROLIFERATIVE TYPE FIBROCYSTIC CHANGES ASSOCIATED WITH PHOSPHATE MICROCALCIFICATIONS PRESENT IN SMALL DUCTULES.  2. BREAST, LEFT ENHANCEMENT, MRI GUIDED NEEDLE CORE BIOPSIES: - RADIAL SCLEROSING LESION (RADIAL SCAR). - ATROPHIC BREAST TISSUE WITH PROLIFERATIVE TYPE FIBROCYSTIC CHANGES INCLUDING FLORID DUCT HYPERPLASIA, STROMAL FIBROSIS WITH FOCI OF PSEUDOANGIOMATOUS STROMAL HYPERPLASIA (PASH), SCLEROSING ADENOSIS, APOCRINE METAPLASIA, AND PHOSPHATE MICROCALCIFICATIONS PRESENT IN SMALL DUCTULES.  On 5/7/2025, she underwent b/l breast lumpectomy: Breast, right 6:00 N8 mass, needle localized lumpectomy: - Prior biopsy site changes with invasive moderately differentiated ductal carcinoma, 3.0 mm (microscopic measurement). - Neither an intraductal component nor lymphovascular invasion is identified. - All surgical margins are negative.   - Classical type lobular carcinoma in situ (LCIS) and atypical lobular hyperplasia (ALH). - Radial sclerosing lesion (radial scar), small duct papilloma, cystic/papillary apocrine metaplasia, microscopic perilobular hemangioma, pseudoangiomatous stromal hyperplasia (PASH), and proliferative type fibrocystic changes associated with phosphate microcalcifications present in small ductules. - Combining the above findings with those from the prior needle core biopsy specimen results in the following AJCC 8th Edition Pathologic Stage: pT1b, pNx, pMx.  Breast, left mass, needle localized lumpectomy: - Large complex sclerosing lesion (radial scar) located adjacent to prior biopsy site changes. - Hyalinized fibroadenoma, nodular cystic/papillary apocrine metaplasia, focal gynecomastia-like hyperplasia associated with pseudoangiomatous stromal hyperplasia (PASH), and proliferative type fibrocystic changes associated with phosphate microcalcifications present in small ductules.  She recovered well from surgery. She has family h/o breast cancer in her mother diagnosed at 50's.

## 2025-06-13 NOTE — CONSULT LETTER
[Dear  ___] : Dear  [unfilled], [Consult Letter:] : I had the pleasure of evaluating your patient, [unfilled]. [Please see my note below.] : Please see my note below. [Consult Closing:] : Thank you very much for allowing me to participate in the care of this patient.  If you have any questions, please do not hesitate to contact me. [Sincerely,] : Sincerely, [FreeTextEntry3] : Brandt Barahona MD [DrAleksander  ___] : Dr. MILLER [DrAleksander ___] : Dr. MILLER

## 2025-06-13 NOTE — HISTORY OF PRESENT ILLNESS
[de-identified] : 70-year-old female is referred by Dr James for right breast cancer (+/+/-), right cLCIS and left radial scar, s/p bilateral lumpectomy on 5/7/25.  Her work-up is as follows: B/L Screening Mammo - 01/16/2025: -The breast(s) is/are heterogeneously dense, which may obscure small masses. -There is a focal asymmetry in the lower central right breast posterior depth. -There is an asymmetry in the lateral right breast mid depth. -There is an asymmetry in the slightly lateral left breast posterior depth. BI-RADS 0: INCOMPLETE - NEED ADDITIONAL IMAGING EVALUATION  B/L Dx Mammo & Sono - 02/13/2025: -In the right breast at 6:00 location there is an irregular mass which persists and additional imaging. -The previously questioned asymmetry in the outer left breast partially persists on additional imaging US BREAST COMPLETE BILATERAL -In the right breast at 6:00 location 8 cm from the nipple there is a hypoechoic irregular mass measuring 0.4 x 0.5 x 0.6 cm. Ultrasound core biopsy is recommended for further evaluation. -In the left breast at 3:00 location 9 cm from the nipple corresponding to the mammographic abnormality there is a benign cyst measuring 0.6 cm. -No suspicious lymph nodes were seen in either axilla. BI-RADS 4: SUSPICIOUS  US Guided Core Bx - 03/17/2025: Right, 6:00 N8, 0.6cm: (tophat) - Invasive ductal carcinoma, moderately differentiated, ER/GA pos %, Her-2 negative (1+ by IHC), Ki 67 5%.  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 0.5 cm enhancing focus with suspicious enhancement kinetics, superior central breast ([id number], 6-8 5). MRI guided biopsy is recommended. Multiple additional areas of similar non-mass enhancement in both breasts, with a dominant area of non-mass enhancement on the left (see below).  LEFT BREAST: Multiple similar appearing areas of non-mass enhancement in both breasts; representative biopsy of the most dominant area of non-mass enhancement in the lateral left breast is recommended.  4/15/25: MRI guided biopsy: 1. BREAST, RIGHT ENHANCEMENT, MRI GUIDED NEEDLE CORE BIOPSIES: - LOBULAR CARCINOMA IN SITU (LCIS), CLASSICAL TYPE. - ATROPHIC BREAST TISSUE WITH PROLIFERATIVE TYPE FIBROCYSTIC CHANGES ASSOCIATED WITH PHOSPHATE MICROCALCIFICATIONS PRESENT IN SMALL DUCTULES.  2. BREAST, LEFT ENHANCEMENT, MRI GUIDED NEEDLE CORE BIOPSIES: - RADIAL SCLEROSING LESION (RADIAL SCAR). - ATROPHIC BREAST TISSUE WITH PROLIFERATIVE TYPE FIBROCYSTIC CHANGES INCLUDING FLORID DUCT HYPERPLASIA, STROMAL FIBROSIS WITH FOCI OF PSEUDOANGIOMATOUS STROMAL HYPERPLASIA (PASH), SCLEROSING ADENOSIS, APOCRINE METAPLASIA, AND PHOSPHATE MICROCALCIFICATIONS PRESENT IN SMALL DUCTULES.  On 5/7/2025, she underwent b/l breast lumpectomy: Breast, right 6:00 N8 mass, needle localized lumpectomy: - Prior biopsy site changes with invasive moderately differentiated ductal carcinoma, 3.0 mm (microscopic measurement). - Neither an intraductal component nor lymphovascular invasion is identified. - All surgical margins are negative.   - Classical type lobular carcinoma in situ (LCIS) and atypical lobular hyperplasia (ALH). - Radial sclerosing lesion (radial scar), small duct papilloma, cystic/papillary apocrine metaplasia, microscopic perilobular hemangioma, pseudoangiomatous stromal hyperplasia (PASH), and proliferative type fibrocystic changes associated with phosphate microcalcifications present in small ductules. - Combining the above findings with those from the prior needle core biopsy specimen results in the following AJCC 8th Edition Pathologic Stage: pT1b, pNx, pMx.  Breast, left mass, needle localized lumpectomy: - Large complex sclerosing lesion (radial scar) located adjacent to prior biopsy site changes. - Hyalinized fibroadenoma, nodular cystic/papillary apocrine metaplasia, focal gynecomastia-like hyperplasia associated with pseudoangiomatous stromal hyperplasia (PASH), and proliferative type fibrocystic changes associated with phosphate microcalcifications present in small ductules.  She recovered well from surgery. She has family h/o breast cancer in her mother diagnosed at 50's.

## 2025-06-13 NOTE — REVIEW OF SYSTEMS
[Diarrhea: Grade 0] : Diarrhea: Grade 0 [Negative] : Allergic/Immunologic [FreeTextEntry9] : back pain

## 2025-06-13 NOTE — ASSESSMENT
[FreeTextEntry1] : 71 yo female has stage IA ([id number]) IDC of the right breast, G2, ER/AR positive, Her-2 negative, cLCIS and complex sclerosing lesion in the left breast, s/p b/l lumpectomy.   Assessment and Plan: We had a detailed discussion regarding to her diagnosis, stage, prognosis and adjuvant systemic therapy. The pathology report was reviewed. Renetta has early stage hormone receptor positive breast cancer with 3 mm small size of tumor, favorable pathologic features. Her risk of distant recurrence is low. She is recommended for adjuvant endocrine therapy.  We reviewed benefit and side effects of adjuvant endocrine therapy with AI. Anastrozole 1 mg daily for 5 years is recommended. The potential sided effects may include but not limit to muscular and skeletal symptoms, arthralgia, increasing osteopenia and osteoporosis, a small increased risk of cardiovascular events and slight increase of hyperlipidemia. We also discussed the option to take Tamoxifen since she is concerning about bone density issue with AI. The side effects of THEODORE were reviewed which may include but not limit to small increased risk of cardiovascular events, blood clots and endometrial cancer, hot flashes, vasomotor symptoms, depression and cataract. She opted to take Anastrozole.   We discussed bone health management. She had bone density on 1/16/25 which showed osteopenia in the femoral neck L and femoral neck R with T score -1.6 and -1.3 respectively. The spine and hip are normal. She is recommended to take calcium and vitamin D supplement, and regular exercise.   She saw Dr. Chan and was recommended to have adjuvant RT 26Gy/5fx. She will start Anastrozole after completion of RT. A script was sent to her pharmacy.   All questions were answered appropriately. She agreed with the plan.   RTO for followup in 3 months.

## 2025-06-13 NOTE — PHYSICAL EXAM
Addended by: KENNETH MAJOR on: 5/1/2024 11:19 AM     Modules accepted: Orders     [Fully active, able to carry on all pre-disease performance without restriction] : Status 0 - Fully active, able to carry on all pre-disease performance without restriction [Normal] : affect appropriate [de-identified] : S/P b/l lumpectomy. Surgical incisions are healing well.

## 2025-06-13 NOTE — REVIEW OF SYSTEMS
[Diarrhea: Grade 0] : Diarrhea: Grade 0 [Negative] : Allergic/Immunologic [FreeTextEntry9] : back pain walker, rolling/wheelchair

## 2025-06-13 NOTE — PHYSICAL EXAM
[Fully active, able to carry on all pre-disease performance without restriction] : Status 0 - Fully active, able to carry on all pre-disease performance without restriction [Normal] : affect appropriate [de-identified] : S/P b/l lumpectomy. Surgical incisions are healing well.

## 2025-06-13 NOTE — ASSESSMENT
[FreeTextEntry1] : 71 yo female has stage IA ([id number]) IDC of the right breast, G2, ER/WA positive, Her-2 negative, cLCIS and complex sclerosing lesion in the left breast, s/p b/l lumpectomy.   Assessment and Plan: We had a detailed discussion regarding to her diagnosis, stage, prognosis and adjuvant systemic therapy. The pathology report was reviewed. Renetta has early stage hormone receptor positive breast cancer with 3 mm small size of tumor, favorable pathologic features. Her risk of distant recurrence is low. She is recommended for adjuvant endocrine therapy.  We reviewed benefit and side effects of adjuvant endocrine therapy with AI. Anastrozole 1 mg daily for 5 years is recommended. The potential sided effects may include but not limit to muscular and skeletal symptoms, arthralgia, increasing osteopenia and osteoporosis, a small increased risk of cardiovascular events and slight increase of hyperlipidemia. We also discussed the option to take Tamoxifen since she is concerning about bone density issue with AI. The side effects of THEODORE were reviewed which may include but not limit to small increased risk of cardiovascular events, blood clots and endometrial cancer, hot flashes, vasomotor symptoms, depression and cataract. She opted to take Anastrozole.   We discussed bone health management. She had bone density on 1/16/25 which showed osteopenia in the femoral neck L and femoral neck R with T score -1.6 and -1.3 respectively. The spine and hip are normal. She is recommended to take calcium and vitamin D supplement, and regular exercise.   She saw Dr. Chan and was recommended to have adjuvant RT 26Gy/5fx. She will start Anastrozole after completion of RT. A script was sent to her pharmacy.   All questions were answered appropriately. She agreed with the plan.   RTO for followup in 3 months.

## 2025-06-13 NOTE — HISTORY OF PRESENT ILLNESS
[de-identified] : 70-year-old female is referred by Dr James for right breast cancer (+/+/-), right cLCIS and left radial scar, s/p bilateral lumpectomy on 5/7/25.  Her work-up is as follows: B/L Screening Mammo - 01/16/2025: -The breast(s) is/are heterogeneously dense, which may obscure small masses. -There is a focal asymmetry in the lower central right breast posterior depth. -There is an asymmetry in the lateral right breast mid depth. -There is an asymmetry in the slightly lateral left breast posterior depth. BI-RADS 0: INCOMPLETE - NEED ADDITIONAL IMAGING EVALUATION  B/L Dx Mammo & Sono - 02/13/2025: -In the right breast at 6:00 location there is an irregular mass which persists and additional imaging. -The previously questioned asymmetry in the outer left breast partially persists on additional imaging US BREAST COMPLETE BILATERAL -In the right breast at 6:00 location 8 cm from the nipple there is a hypoechoic irregular mass measuring 0.4 x 0.5 x 0.6 cm. Ultrasound core biopsy is recommended for further evaluation. -In the left breast at 3:00 location 9 cm from the nipple corresponding to the mammographic abnormality there is a benign cyst measuring 0.6 cm. -No suspicious lymph nodes were seen in either axilla. BI-RADS 4: SUSPICIOUS  US Guided Core Bx - 03/17/2025: Right, 6:00 N8, 0.6cm: (tophat) - Invasive ductal carcinoma, moderately differentiated, ER/RI pos %, Her-2 negative (1+ by IHC), Ki 67 5%.  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 0.5 cm enhancing focus with suspicious enhancement kinetics, superior central breast ([id number], 6-8 5). MRI guided biopsy is recommended. Multiple additional areas of similar non-mass enhancement in both breasts, with a dominant area of non-mass enhancement on the left (see below).  LEFT BREAST: Multiple similar appearing areas of non-mass enhancement in both breasts; representative biopsy of the most dominant area of non-mass enhancement in the lateral left breast is recommended.  4/15/25: MRI guided biopsy: 1. BREAST, RIGHT ENHANCEMENT, MRI GUIDED NEEDLE CORE BIOPSIES: - LOBULAR CARCINOMA IN SITU (LCIS), CLASSICAL TYPE. - ATROPHIC BREAST TISSUE WITH PROLIFERATIVE TYPE FIBROCYSTIC CHANGES ASSOCIATED WITH PHOSPHATE MICROCALCIFICATIONS PRESENT IN SMALL DUCTULES.  2. BREAST, LEFT ENHANCEMENT, MRI GUIDED NEEDLE CORE BIOPSIES: - RADIAL SCLEROSING LESION (RADIAL SCAR). - ATROPHIC BREAST TISSUE WITH PROLIFERATIVE TYPE FIBROCYSTIC CHANGES INCLUDING FLORID DUCT HYPERPLASIA, STROMAL FIBROSIS WITH FOCI OF PSEUDOANGIOMATOUS STROMAL HYPERPLASIA (PASH), SCLEROSING ADENOSIS, APOCRINE METAPLASIA, AND PHOSPHATE MICROCALCIFICATIONS PRESENT IN SMALL DUCTULES.  On 5/7/2025, she underwent b/l breast lumpectomy: Breast, right 6:00 N8 mass, needle localized lumpectomy: - Prior biopsy site changes with invasive moderately differentiated ductal carcinoma, 3.0 mm (microscopic measurement). - Neither an intraductal component nor lymphovascular invasion is identified. - All surgical margins are negative.   - Classical type lobular carcinoma in situ (LCIS) and atypical lobular hyperplasia (ALH). - Radial sclerosing lesion (radial scar), small duct papilloma, cystic/papillary apocrine metaplasia, microscopic perilobular hemangioma, pseudoangiomatous stromal hyperplasia (PASH), and proliferative type fibrocystic changes associated with phosphate microcalcifications present in small ductules. - Combining the above findings with those from the prior needle core biopsy specimen results in the following AJCC 8th Edition Pathologic Stage: pT1b, pNx, pMx.  Breast, left mass, needle localized lumpectomy: - Large complex sclerosing lesion (radial scar) located adjacent to prior biopsy site changes. - Hyalinized fibroadenoma, nodular cystic/papillary apocrine metaplasia, focal gynecomastia-like hyperplasia associated with pseudoangiomatous stromal hyperplasia (PASH), and proliferative type fibrocystic changes associated with phosphate microcalcifications present in small ductules.  She recovered well from surgery. She has family h/o breast cancer in her mother diagnosed at 50's.

## 2025-06-23 NOTE — HISTORY OF PRESENT ILLNESS
[FreeTextEntry1] : 6/24/25 OTV 2/5 treatments to the right breast:  CHEKO MTZ is a 70-year-old female patient who presents for right breast cancer (+/+/-), right ALH and left radial scar s/p bilateral lumpectomies 5/7/25: right= invasive moderately differentiated ductal carcinoma, 3.0 mm; Right ALH. pT1b, pNx, pMx; Left = radial scar.  Family hx - breast ca - mother.  Her work-up is as follows: B/L Screening Mammo - 01/16/2025: -The breast(s) is/are heterogeneously dense, which may obscure small masses. -There is a focal asymmetry in the lower central right breast posterior depth. -There is an asymmetry in the lateral right breast mid depth. -There is an asymmetry in the slightly lateral left breast posterior depth. BI-RADS 0: INCOMPLETE - NEED ADDITIONAL IMAGING EVALUATION  B/L Dx Mammo & Sono - 02/13/2025: -In the right breast at 6:00 location there is an irregular mass which persists and additional imaging. -The previously questioned asymmetry in the outer left breast partially persists on additional imaging US BREAST COMPLETE BILATERAL -In the right breast at 6:00 location 8 cm from the nipple there is a hypoechoic irregular mass measuring 0.4 x 0.5 x 0.6 cm. Ultrasound core biopsy is recommended for further evaluation. -In the left breast at 3:00 location 9 cm from the nipple corresponding to the mammographic abnormality there is a benign cyst measuring 0.6 cm. -No suspicious lymph nodes were seen in either axilla. 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 Surgical consultation and oncologic management of the right breast(s) are recommended.  4/7/25: MRI --> BIRADS 4 RIGHT BREAST: 0.9 x 0.7 x 0.9 cm index carcinoma with adjacent nonmass enhancement and associated biopsy clip ([id number], 6-84). 0.5 cm enhancing focus with suspicious enhancement kinetics, superior central breast ([id number], 6-8 5). MRI guided biopsy is recommended. Multiple additional areas of similar non-mass enhancement in both breasts, with a dominant area of non-mass enhancement on the left (see below).  LEFT BREAST: Multiple similar appearing areas of non-mass enhancement in both breasts; representative biopsy of the most dominant area of non-mass enhancement in the lateral left breast is recommended ([id number], 6-20).  4/15/25: MRI Bx, B 1. BREAST, RIGHT ENHANCEMENT, MRI GUIDED NEEDLE CORE BIOPSIES: - LOBULAR CARCINOMA IN SITU (LCIS), CLASSICAL TYPE. - ATROPHIC BREAST TISSUE WITH PROLIFERATIVE TYPE FIBROCYSTIC CHANGES ASSOCIATED WITH PHOSPHATE MICROCALCIFICATIONS PRESENT IN SMALL DUCTULES.  2. BREAST, LEFT ENHANCEMENT, MRI GUIDED NEEDLE CORE BIOPSIES: - RADIAL SCLEROSING LESION (RADIAL SCAR). - ATROPHIC BREAST TISSUE WITH PROLIFERATIVE TYPE FIBROCYSTIC CHANGES INCLUDING FLORID DUCT HYPERPLASIA, STROMAL FIBROSIS WITH FOCI OF PSEUDOANGIOMATOUS STROMAL HYPERPLASIA (PASH), SCLEROSING ADENOSIS, APOCRINE METAPLASIA, AND PHOSPHATE MICROCALCIFICATIONS PRESENT IN SMALL DUCTULES.  5/7/2025 1. Breast, right 6:00 N8 mass, needle localized lumpectomy: - Prior biopsy site changes with invasive moderately differentiated ductal carcinoma, 3.0 mm (microscopic measurement). - Neither an intraductal component nor lymphovascular invasion is identified. - For final surgical margin status please see specimen parts #2-#7 below. - Classical type lobular carcinoma in situ (LCIS) and atypical lobular hyperplasia (ALH). - Radial sclerosing lesion (radial scar), small duct papilloma, cystic/papillary apocrine metaplasia, microscopic perilobular hemangioma, pseudoangiomatous stromal hyperplasia (PASH), and proliferative type fibrocystic changes associated with phosphate microcalcifications present in small ductules. - Combining the above findings with those from the prior needle core biopsy specimen results in the following AJCC 8th Edition Pathologic Stage: pT1b, pNx, pMx.  2. Breast, right superior margin, excision: - Benign fibrofatty breast tissue without histopathologic abnormality. Negative for carcinoma.  3. Breast, right medial margin, excision: - Benign fibrofatty breast tissue without histopathologic abnormality. Negative for carcinoma.  4. Breast, right inferior margin, excision: - Benign fibrofatty breast tissue without histopathologic abnormality.  Negative for carcinoma.  5. Breast, right lateral margin, excision: - Benign fibrofatty breast tissue without histopathologic abnormality. Negative for carcinoma.  6. Breast, right posterior margin, excision: - Benign fibrofatty breast tissue without histopathologic abnormality. Negative for carcinoma.  7. Breast, right anterior margin, excision: - Benign fibrofatty breast tissue without histopathologic abnormality. Negative for carcinoma.  8. Breast, right superior lesion, needle localized lumpectomy: - Atypical lobular hyperplasia (ALH). - Small hyalinized fibroadenoma, nodular cystic/papillary apocrine metaplasia, and proliferative type fibrocystic changes. - Prior biopsy site changes.  9. Breast, left mass, needle localized lumpectomy: - Large complex sclerosing lesion (radial scar) located adjacent to prior biopsy site changes. - Hyalinized fibroadenoma, nodular cystic/papillary apocrine metaplasia, focal gynecomastia-like hyperplasia associated with pseudoangiomatous stromal hyperplasia (PASH), and proliferative type fibrocystic changes associated with phosphate microcalcifications present in small ductules.

## 2025-06-23 NOTE — VITALS
[Maximal Pain Intensity: 0/10] : 0/10 [90: Able to carry normal activity; minor signs or symptoms of disease.] : 90: Able to carry normal activity; minor signs or symptoms of disease.  [Date: ____________] : Patient's last distress assessment performed on [unfilled]. [4 - Distress Level] : Distress Level: 4 [Referred Patient  to social work for follow-up] : Patient was referred to social work for follow-up [Patient given social work contact information and resource sheet] : Patient was given social work contact information and resource sheet

## 2025-06-26 NOTE — DISEASE MANAGEMENT
[FreeTextEntry4] : right breast IDC, ER+, WY+, Her2 negative [TTNM] : 1b [NTNM] : x [MTNM] : x [de-identified] : 1040cGy [de-identified] : [id number] [de-identified] : Right breast

## 2025-06-26 NOTE — HISTORY OF PRESENT ILLNESS
[FreeTextEntry1] : 6/25/2025 OTV: 2 of 5 to the right breast: Patient reports feeling well. She denies pain. She has intermittent sharp shooting sensation in the right breast; she does not require pain meds. She moisturizes daily with Aquaphor.

## 2025-06-26 NOTE — DISEASE MANAGEMENT
[FreeTextEntry4] : right breast IDC, ER+, NM+, Her2 negative [TTNM] : 1b [NTNM] : x [MTNM] : x [de-identified] : 1040cGy [de-identified] : [id number] [de-identified] : Right breast